# Patient Record
Sex: MALE | Race: WHITE | NOT HISPANIC OR LATINO | Employment: FULL TIME | URBAN - METROPOLITAN AREA
[De-identification: names, ages, dates, MRNs, and addresses within clinical notes are randomized per-mention and may not be internally consistent; named-entity substitution may affect disease eponyms.]

---

## 2023-06-02 ENCOUNTER — OFFICE VISIT (OUTPATIENT)
Dept: PRIMARY CARE | Facility: CLINIC | Age: 43
End: 2023-06-02
Payer: COMMERCIAL

## 2023-06-02 VITALS
BODY MASS INDEX: 24.91 KG/M2 | SYSTOLIC BLOOD PRESSURE: 118 MMHG | DIASTOLIC BLOOD PRESSURE: 76 MMHG | WEIGHT: 168.2 LBS | HEIGHT: 69 IN | TEMPERATURE: 97.8 F

## 2023-06-02 DIAGNOSIS — Z00.00 HEALTHCARE MAINTENANCE: Primary | ICD-10-CM

## 2023-06-02 PROCEDURE — 99213 OFFICE O/P EST LOW 20 MIN: CPT | Performed by: INTERNAL MEDICINE

## 2023-06-02 PROCEDURE — 1036F TOBACCO NON-USER: CPT | Performed by: INTERNAL MEDICINE

## 2023-06-02 NOTE — PROGRESS NOTES
Subjective   Patient ID: [id number]   [facility]    Oswald Montilla is a 43 y.o. male who presents for New Patient Visit (Questioning wether he needs testosterone injections).  He came here to check his testosterone. He is feeling ok though.      Objective   Visit Vitals  /76 (BP Location: Left arm, Patient Position: Sitting)   Temp 36.6 °C (97.8 °F) (Skin)      Review of Systems  All 12 systems reviewed, no other abnormality except that mentioned in HPI.    Physical Exam  Constitutional- no abnormality  ENT- no abnormality  Neck- no swelling  CVS- normal S1 and S2, no murmur.  Pulmonary- clear to auscultation,no rhonchi, no wheezes.  Abdomen- normal- liver and spleen, soft, no distension, bowel sound present.  Neurological- all cranial nerves intact, speech and gait normal, no sensory or motor deficiency.  Musculoskeletal- all pulses are normal, normal movement, no joint swelling.  Skin- no rash, dry.  psychiatry- no suicidal ideation.  Genitourinary system- no abnormality.  Lymph node- no lyphadenopathy      Assessment/Plan   Problem List Items Addressed This Visit    None  Visit Diagnoses       Healthcare maintenance    -  Primary    Relevant Orders    Testosterone, total and free        His blood work ordered and need a physical visit.    Deep Sutherland MD

## 2023-06-07 ENCOUNTER — LAB (OUTPATIENT)
Dept: LAB | Facility: LAB | Age: 43
End: 2023-06-07
Payer: COMMERCIAL

## 2023-06-07 DIAGNOSIS — Z00.00 HEALTHCARE MAINTENANCE: ICD-10-CM

## 2023-06-07 LAB
ALANINE AMINOTRANSFERASE (SGPT) (U/L) IN SER/PLAS: 20 U/L (ref 10–52)
ALBUMIN (G/DL) IN SER/PLAS: 4.1 G/DL (ref 3.4–5)
ALKALINE PHOSPHATASE (U/L) IN SER/PLAS: 86 U/L (ref 33–120)
ANION GAP IN SER/PLAS: 16 MMOL/L (ref 10–20)
ASPARTATE AMINOTRANSFERASE (SGOT) (U/L) IN SER/PLAS: 24 U/L (ref 9–39)
BILIRUBIN TOTAL (MG/DL) IN SER/PLAS: 1.1 MG/DL (ref 0–1.2)
CALCIDIOL (25 OH VITAMIN D3) (NG/ML) IN SER/PLAS: 38 NG/ML
CALCIUM (MG/DL) IN SER/PLAS: 9.5 MG/DL (ref 8.6–10.6)
CARBON DIOXIDE, TOTAL (MMOL/L) IN SER/PLAS: 26 MMOL/L (ref 21–32)
CHLORIDE (MMOL/L) IN SER/PLAS: 102 MMOL/L (ref 98–107)
CHOLESTEROL (MG/DL) IN SER/PLAS: 160 MG/DL (ref 0–199)
CHOLESTEROL IN HDL (MG/DL) IN SER/PLAS: 52.8 MG/DL
CHOLESTEROL/HDL RATIO: 3
CREATININE (MG/DL) IN SER/PLAS: 0.73 MG/DL (ref 0.5–1.3)
ERYTHROCYTE DISTRIBUTION WIDTH (RATIO) BY AUTOMATED COUNT: 14.5 % (ref 11.5–14.5)
ERYTHROCYTE MEAN CORPUSCULAR HEMOGLOBIN CONCENTRATION (G/DL) BY AUTOMATED: 31.8 G/DL (ref 32–36)
ERYTHROCYTE MEAN CORPUSCULAR VOLUME (FL) BY AUTOMATED COUNT: 83 FL (ref 80–100)
ERYTHROCYTES (10*6/UL) IN BLOOD BY AUTOMATED COUNT: 5.41 X10E12/L (ref 4.5–5.9)
ESTIMATED AVERAGE GLUCOSE FOR HBA1C: 117 MG/DL
GFR MALE: >90 ML/MIN/1.73M2
GLUCOSE (MG/DL) IN SER/PLAS: 76 MG/DL (ref 74–99)
HEMATOCRIT (%) IN BLOOD BY AUTOMATED COUNT: 44.9 % (ref 41–52)
HEMOGLOBIN (G/DL) IN BLOOD: 14.3 G/DL (ref 13.5–17.5)
HEMOGLOBIN A1C/HEMOGLOBIN TOTAL IN BLOOD: 5.7 %
LDL: 99 MG/DL (ref 0–99)
LEUKOCYTES (10*3/UL) IN BLOOD BY AUTOMATED COUNT: 4.7 X10E9/L (ref 4.4–11.3)
NRBC (PER 100 WBCS) BY AUTOMATED COUNT: 0 /100 WBC (ref 0–0)
PLATELETS (10*3/UL) IN BLOOD AUTOMATED COUNT: 241 X10E9/L (ref 150–450)
POTASSIUM (MMOL/L) IN SER/PLAS: 4.6 MMOL/L (ref 3.5–5.3)
PROTEIN TOTAL: 7 G/DL (ref 6.4–8.2)
SODIUM (MMOL/L) IN SER/PLAS: 139 MMOL/L (ref 136–145)
THYROTROPIN (MIU/L) IN SER/PLAS BY DETECTION LIMIT <= 0.05 MIU/L: 0.79 MIU/L (ref 0.44–3.98)
TRIGLYCERIDE (MG/DL) IN SER/PLAS: 39 MG/DL (ref 0–149)
UREA NITROGEN (MG/DL) IN SER/PLAS: 25 MG/DL (ref 6–23)
VLDL: 8 MG/DL (ref 0–40)

## 2023-06-07 PROCEDURE — 84402 ASSAY OF FREE TESTOSTERONE: CPT

## 2023-06-07 PROCEDURE — 84403 ASSAY OF TOTAL TESTOSTERONE: CPT

## 2023-06-07 PROCEDURE — 36415 COLL VENOUS BLD VENIPUNCTURE: CPT

## 2023-06-07 PROCEDURE — 84443 ASSAY THYROID STIM HORMONE: CPT

## 2023-06-07 PROCEDURE — 83036 HEMOGLOBIN GLYCOSYLATED A1C: CPT

## 2023-06-07 PROCEDURE — 85027 COMPLETE CBC AUTOMATED: CPT

## 2023-06-07 PROCEDURE — 80053 COMPREHEN METABOLIC PANEL: CPT

## 2023-06-07 PROCEDURE — 82306 VITAMIN D 25 HYDROXY: CPT

## 2023-06-07 PROCEDURE — 80061 LIPID PANEL: CPT

## 2023-06-09 ENCOUNTER — TELEPHONE (OUTPATIENT)
Dept: PRIMARY CARE | Facility: CLINIC | Age: 43
End: 2023-06-09
Payer: COMMERCIAL

## 2023-06-09 NOTE — TELEPHONE ENCOUNTER
----- Message from Deep Sutherland MD sent at 6/9/2023  3:11 PM EDT -----  Lab results reviewed.    CBC shows normal count  Hemoglobin A1C 5.7   thyroid function test normal  kidney function test normal    liver function test normal  Blood lipid ok, diet control.   Vitamin D 25, can take orally  5000 units daily OTC.

## 2023-06-16 ENCOUNTER — TELEPHONE (OUTPATIENT)
Dept: PRIMARY CARE | Facility: CLINIC | Age: 43
End: 2023-06-16
Payer: COMMERCIAL

## 2023-06-16 LAB
TESTOSTERONE FREE (CHAN): 53.9 PG/ML (ref 35–155)
TESTOSTERONE,TOTAL,LC-MS/MS: 396 NG/DL (ref 250–1100)

## 2023-06-16 NOTE — TELEPHONE ENCOUNTER
----- Message from Deep Sutherland MD sent at 6/16/2023  9:01 AM EDT -----  Testosterone level normal.  ----- Message -----  From: Lab, Background User  Sent: 6/7/2023   1:29 PM EDT  To: Deep Sutherland MD

## 2023-07-05 ENCOUNTER — OFFICE VISIT (OUTPATIENT)
Dept: PRIMARY CARE | Facility: CLINIC | Age: 43
End: 2023-07-05
Payer: COMMERCIAL

## 2023-07-05 VITALS — WEIGHT: 162.2 LBS | DIASTOLIC BLOOD PRESSURE: 60 MMHG | BODY MASS INDEX: 23.95 KG/M2 | SYSTOLIC BLOOD PRESSURE: 118 MMHG

## 2023-07-05 DIAGNOSIS — Z00.00 ANNUAL PHYSICAL EXAM: Primary | ICD-10-CM

## 2023-07-05 DIAGNOSIS — E55.9 VITAMIN D DEFICIENCY: ICD-10-CM

## 2023-07-05 PROCEDURE — 1036F TOBACCO NON-USER: CPT | Performed by: INTERNAL MEDICINE

## 2023-07-05 PROCEDURE — 99396 PREV VISIT EST AGE 40-64: CPT | Performed by: INTERNAL MEDICINE

## 2023-07-05 RX ORDER — VALACYCLOVIR HYDROCHLORIDE 1 G/1
TABLET, FILM COATED ORAL AS NEEDED
COMMUNITY
Start: 2018-09-19

## 2023-07-05 RX ORDER — CICLOPIROX 1 G/100ML
SHAMPOO TOPICAL
COMMUNITY
Start: 2023-06-21

## 2023-07-05 RX ORDER — CLINDAMYCIN PHOSPHATE 10 MG/G
GEL TOPICAL
COMMUNITY
Start: 2023-06-21

## 2023-07-05 ASSESSMENT — PATIENT HEALTH QUESTIONNAIRE - PHQ9: 2. FEELING DOWN, DEPRESSED OR HOPELESS: NOT AT ALL

## 2023-07-05 NOTE — PROGRESS NOTES
Subjective   Patient ID: [id number]   [facility]    Oswald Montilla is a 43 y.o. male who presents for Annual Exam.Discussed lab results with him specially testosterone. He will take Vitamin D OTC.        Objective   Visit Vitals  /60 (BP Location: Left arm, Patient Position: Sitting)      Review of Systems  All 12 systems reviewed, no other abnormality except that mentioned in HPI.    Physical Exam  Constitutional- no abnormality  ENT- no abnormality  Neck- no swelling  CVS- normal S1 and S2, no murmur.  Pulmonary- clear to auscultation,no rhonchi, no wheezes.  Abdomen- normal- liver and spleen, soft, no distension, bowel sound present.  Neurological- all cranial nerves intact, speech and gait normal, no sensory or motor deficiency.  Musculoskeletal- all pulses are normal, normal movement, no joint swelling.  Skin- no rash, dry.  psychiatry- no suicidal ideation.  Genitourinary system- no abnormality.  Lymph node- no lyphadenopathy      Assessment/Plan   Problem List Items Addressed This Visit       Vitamin D deficiency     Advised to take vitamin D OTC 5000 units PO daily.          Other Visit Diagnoses       Annual physical exam    -  Primary            Deep Sutherland MD

## 2024-06-12 ENCOUNTER — APPOINTMENT (OUTPATIENT)
Dept: PRIMARY CARE | Facility: CLINIC | Age: 44
End: 2024-06-12
Payer: COMMERCIAL

## 2024-06-12 VITALS
TEMPERATURE: 97.7 F | HEIGHT: 71 IN | DIASTOLIC BLOOD PRESSURE: 78 MMHG | BODY MASS INDEX: 24.36 KG/M2 | WEIGHT: 174 LBS | SYSTOLIC BLOOD PRESSURE: 120 MMHG

## 2024-06-12 DIAGNOSIS — I51.81 STRESS-INDUCED CARDIOMYOPATHY: ICD-10-CM

## 2024-06-12 DIAGNOSIS — R73.03 PREDIABETES: Primary | ICD-10-CM

## 2024-06-12 DIAGNOSIS — N52.9 ERECTILE DYSFUNCTION, UNSPECIFIED ERECTILE DYSFUNCTION TYPE: ICD-10-CM

## 2024-06-12 DIAGNOSIS — B00.89 HERPES GLADIATORUM: ICD-10-CM

## 2024-06-12 DIAGNOSIS — Z86.79 HISTORY OF ATRIAL FIBRILLATION: ICD-10-CM

## 2024-06-12 DIAGNOSIS — I48.91 ATRIAL FIBRILLATION, UNSPECIFIED TYPE (MULTI): ICD-10-CM

## 2024-06-12 PROBLEM — Z97.3 WEARS EYEGLASSES: Status: ACTIVE | Noted: 2024-06-12

## 2024-06-12 PROBLEM — I42.9 CARDIOMYOPATHY (MULTI): Status: ACTIVE | Noted: 2024-06-12

## 2024-06-12 PROBLEM — R79.89 LOW TESTOSTERONE: Status: ACTIVE | Noted: 2024-06-12

## 2024-06-12 PROBLEM — F32.A DEPRESSION DUE TO HEAD INJURY: Status: ACTIVE | Noted: 2024-06-12

## 2024-06-12 PROBLEM — E55.9 VITAMIN D INSUFFICIENCY: Status: ACTIVE | Noted: 2024-06-12

## 2024-06-12 PROBLEM — S09.90XA DEPRESSION DUE TO HEAD INJURY: Status: ACTIVE | Noted: 2024-06-12

## 2024-06-12 PROBLEM — H53.2 DOUBLE VISION: Status: ACTIVE | Noted: 2024-06-12

## 2024-06-12 PROBLEM — B00.9 HERPES SIMPLEX: Status: ACTIVE | Noted: 2024-06-12

## 2024-06-12 PROBLEM — F41.1 GAD (GENERALIZED ANXIETY DISORDER): Status: ACTIVE | Noted: 2024-06-12

## 2024-06-12 PROCEDURE — 99214 OFFICE O/P EST MOD 30 MIN: CPT | Performed by: FAMILY MEDICINE

## 2024-06-12 RX ORDER — SILDENAFIL 50 MG/1
50 TABLET, FILM COATED ORAL DAILY PRN
Qty: 10 TABLET | Refills: 0 | Status: SHIPPED | OUTPATIENT
Start: 2024-06-12 | End: 2024-07-12

## 2024-06-12 ASSESSMENT — PATIENT HEALTH QUESTIONNAIRE - PHQ9
2. FEELING DOWN, DEPRESSED OR HOPELESS: NOT AT ALL
1. LITTLE INTEREST OR PLEASURE IN DOING THINGS: NOT AT ALL
SUM OF ALL RESPONSES TO PHQ9 QUESTIONS 1 AND 2: 0

## 2024-06-12 NOTE — PROGRESS NOTES
"Subjective   Patient ID: Oswald Montilla is a 44 y.o. male who presents for Follow-up (GABRIELA from Dr. Sutherland, discuss getting on Viagra).    Patient presenting for transfer of care and wants to discuss Viagra for erectile dysfunction.  Last lab work was completed about a year ago testosterone was within normal limits A1c was elevated at 5.7 prediabetes CBC within normal limits CMP within normal limits lipid panel within normal limits TSH within normal limits    Works as a teacher in [employer]     Hx of appendectomy     Cardiomyoatphy   - echocaridogram in 2020 EF 35-40%  - left atrium moderately dilated   - global hypokinesis   - no swelling   - no SOB     A fib s/p ablation   - hasn't experienced in some years  - occasional palpitations.     Objective   /78   Temp 36.5 °C (97.7 °F)   Ht 1.791 m (5' 10.5\")   Wt 78.9 kg (174 lb)   BMI 24.61 kg/m²     Physical Exam  Constitutional:       General: He is not in acute distress.     Appearance: Normal appearance. He is not ill-appearing, toxic-appearing or diaphoretic.   HENT:      Head: Normocephalic and atraumatic.   Eyes:      Extraocular Movements: Extraocular movements intact.      Pupils: Pupils are equal, round, and reactive to light.   Cardiovascular:      Rate and Rhythm: Normal rate and regular rhythm.      Pulses: Normal pulses.      Heart sounds: Normal heart sounds.   Pulmonary:      Effort: Pulmonary effort is normal.      Breath sounds: Normal breath sounds.   Neurological:      Mental Status: He is alert.         Assessment/Plan   Diagnoses and all orders for this visit:  Prediabetes  -     Hemoglobin A1C; Future  Erectile dysfunction, unspecified erectile dysfunction type  -     Comprehensive Metabolic Panel; Future  -     TSH with reflex to Free T4 if abnormal; Future  -     Testosterone,Free and Total; Future  -     Lipid Panel; Future  -     Prolactin; Future  -     sildenafil (Viagra) 50 mg tablet; Take 1 tablet (50 mg) by mouth once " daily as needed for erectile dysfunction.  Stress-induced cardiomyopathy  -     Echocardiogram - Onbase Scan; Future  History of atrial fibrillation  - s/p ablation; hasn't seen cardio in years.   Herpes gladiatorum      Discussed chronic conditions as above. Will obtain echo as he has hx of EF 40-45%; and no follow-up for several years. Would ideally have him follow w/ cardiology.       Clarice Saravia DO     I spent a total of 26 minutes on the date of the service which included preparing to see the patient, face-to-face patient care, completing clinical documentation, performing a medically appropriate examination, counseling and educating the patient/family/caregiver and ordering medications, tests, or procedures.

## 2024-07-08 ENCOUNTER — APPOINTMENT (OUTPATIENT)
Dept: PRIMARY CARE | Facility: CLINIC | Age: 44
End: 2024-07-08
Payer: COMMERCIAL

## 2024-09-24 DIAGNOSIS — B35.9 TINEA: Primary | ICD-10-CM

## 2024-09-24 RX ORDER — CICLOPIROX 1 G/100ML
1 SHAMPOO TOPICAL EVERY OTHER DAY
Qty: 120 ML | Refills: 3 | Status: SHIPPED | OUTPATIENT
Start: 2024-09-24 | End: 2025-09-24

## 2025-02-05 DIAGNOSIS — N52.9 ERECTILE DYSFUNCTION, UNSPECIFIED ERECTILE DYSFUNCTION TYPE: ICD-10-CM

## 2025-02-05 RX ORDER — SILDENAFIL 50 MG/1
50 TABLET, FILM COATED ORAL DAILY PRN
Qty: 10 TABLET | Refills: 1 | Status: SHIPPED | OUTPATIENT
Start: 2025-02-05 | End: 2025-03-07

## 2025-03-04 ENCOUNTER — OFFICE VISIT (OUTPATIENT)
Dept: PRIMARY CARE | Facility: CLINIC | Age: 45
End: 2025-03-04
Payer: COMMERCIAL

## 2025-03-04 VITALS
SYSTOLIC BLOOD PRESSURE: 100 MMHG | DIASTOLIC BLOOD PRESSURE: 61 MMHG | BODY MASS INDEX: 25.94 KG/M2 | WEIGHT: 183.4 LBS | HEART RATE: 56 BPM | TEMPERATURE: 97.5 F

## 2025-03-04 DIAGNOSIS — R79.89 LOW TESTOSTERONE: Primary | ICD-10-CM

## 2025-03-04 DIAGNOSIS — E55.9 VITAMIN D DEFICIENCY: ICD-10-CM

## 2025-03-04 DIAGNOSIS — R53.83 OTHER FATIGUE: ICD-10-CM

## 2025-03-04 PROCEDURE — 99213 OFFICE O/P EST LOW 20 MIN: CPT | Performed by: STUDENT IN AN ORGANIZED HEALTH CARE EDUCATION/TRAINING PROGRAM

## 2025-03-04 PROCEDURE — 1036F TOBACCO NON-USER: CPT | Performed by: STUDENT IN AN ORGANIZED HEALTH CARE EDUCATION/TRAINING PROGRAM

## 2025-03-04 ASSESSMENT — ENCOUNTER SYMPTOMS
DIARRHEA: 0
LIGHT-HEADEDNESS: 0
VOMITING: 0
UNEXPECTED WEIGHT CHANGE: 0
NAUSEA: 0
DIZZINESS: 0
CHILLS: 0
ARTHRALGIAS: 0
MYALGIAS: 0
CONSTIPATION: 0
ABDOMINAL PAIN: 0
FATIGUE: 1
HEADACHES: 0
SHORTNESS OF BREATH: 0
COUGH: 0
FEVER: 0
BLOOD IN STOOL: 0
RHINORRHEA: 0

## 2025-03-04 ASSESSMENT — PATIENT HEALTH QUESTIONNAIRE - PHQ9
1. LITTLE INTEREST OR PLEASURE IN DOING THINGS: NOT AT ALL
2. FEELING DOWN, DEPRESSED OR HOPELESS: NOT AT ALL
SUM OF ALL RESPONSES TO PHQ9 QUESTIONS 1 AND 2: 0

## 2025-03-04 NOTE — PROGRESS NOTES
Assessment/Plan   Assessment & Plan  Low testosterone    Orders:    Testosterone, total and free; Future    Vitamin D deficiency    Orders:    Vitamin D 25-Hydroxy,Total (for eval of Vitamin D levels); Future    Other fatigue    Orders:    CBC; Future    Comprehensive Metabolic Panel; Future    Hemoglobin A1C; Future    Testosterone, total and free; Future    Vitamin B12; Future    Vitamin D 25-Hydroxy,Total (for eval of Vitamin D levels); Future    Tsh With Reflex To Free T4 If Abnormal; Future        Subjective   Patient ID: Oswald Montilla is a 44 y.o. male who presents for Fatigue (Noticed his activity level has changed. He is not able to do as many pull ups in his work out then he did. He said it's the same thing for running outside. He is struggling to finish any work out. ) and Lab Orders (Would like blood work done.).  Fatigue  Associated symptoms include fatigue. Pertinent negatives include no abdominal pain, arthralgias, chest pain, chills, congestion, coughing, fever, headaches, myalgias, nausea, rash or vomiting.     Patient is here to discuss fatigue.. He feels weaker than usual, has beenstruggling to do strength training eercises and has lesss stamina than in the past. Denies other associated symptoms.       Review of Systems   Constitutional:  Positive for fatigue. Negative for chills, fever and unexpected weight change.   HENT:  Negative for congestion and rhinorrhea.    Eyes:  Negative for visual disturbance.   Respiratory:  Negative for cough and shortness of breath.    Cardiovascular:  Negative for chest pain.   Gastrointestinal:  Negative for abdominal pain, blood in stool, constipation, diarrhea, nausea and vomiting.   Musculoskeletal:  Negative for arthralgias and myalgias.   Skin:  Negative for rash.   Neurological:  Negative for dizziness, light-headedness and headaches.       Objective   Physical Exam  Constitutional:       Appearance: Normal appearance.   HENT:      Head: Normocephalic  and atraumatic.   Eyes:      Extraocular Movements: Extraocular movements intact.   Neurological:      General: No focal deficit present.      Mental Status: He is alert and oriented to person, place, and time.      Motor: No weakness.   Psychiatric:         Mood and Affect: Mood normal.         Behavior: Behavior normal.         Thought Content: Thought content normal.         Judgment: Judgment normal.                 Bebeto Infante MD 03/04/25 7:55 AM

## 2025-03-06 LAB
25(OH)D3+25(OH)D2 SERPL-MCNC: 35 NG/ML (ref 30–100)
ALBUMIN SERPL-MCNC: 4.2 G/DL (ref 3.6–5.1)
ALP SERPL-CCNC: 82 U/L (ref 36–130)
ALT SERPL-CCNC: 17 U/L (ref 9–46)
ANION GAP SERPL CALCULATED.4IONS-SCNC: 9 MMOL/L (CALC) (ref 7–17)
AST SERPL-CCNC: 20 U/L (ref 10–40)
BILIRUB SERPL-MCNC: 0.8 MG/DL (ref 0.2–1.2)
BUN SERPL-MCNC: 21 MG/DL (ref 7–25)
CALCIUM SERPL-MCNC: 9 MG/DL (ref 8.6–10.3)
CHLORIDE SERPL-SCNC: 101 MMOL/L (ref 98–110)
CO2 SERPL-SCNC: 27 MMOL/L (ref 20–32)
CREAT SERPL-MCNC: 0.73 MG/DL (ref 0.6–1.29)
EGFRCR SERPLBLD CKD-EPI 2021: 114 ML/MIN/1.73M2
ERYTHROCYTE [DISTWIDTH] IN BLOOD BY AUTOMATED COUNT: 14.9 % (ref 11–15)
EST. AVERAGE GLUCOSE BLD GHB EST-MCNC: 131 MG/DL
EST. AVERAGE GLUCOSE BLD GHB EST-SCNC: 7.3 MMOL/L
GLUCOSE SERPL-MCNC: 99 MG/DL (ref 65–99)
HBA1C MFR BLD: 6.2 % OF TOTAL HGB
HCT VFR BLD AUTO: 46.7 % (ref 38.5–50)
HGB BLD-MCNC: 15.1 G/DL (ref 13.2–17.1)
MCH RBC QN AUTO: 26.7 PG (ref 27–33)
MCHC RBC AUTO-ENTMCNC: 32.3 G/DL (ref 32–36)
MCV RBC AUTO: 82.5 FL (ref 80–100)
PLATELET # BLD AUTO: 259 THOUSAND/UL (ref 140–400)
PMV BLD REES-ECKER: 9.9 FL (ref 7.5–12.5)
POTASSIUM SERPL-SCNC: 4.9 MMOL/L (ref 3.5–5.3)
PROT SERPL-MCNC: 7.2 G/DL (ref 6.1–8.1)
RBC # BLD AUTO: 5.66 MILLION/UL (ref 4.2–5.8)
SODIUM SERPL-SCNC: 137 MMOL/L (ref 135–146)
TESTOST FREE SERPL-MCNC: NORMAL PG/ML
TESTOST SERPL-MCNC: NORMAL NG/DL
TSH SERPL-ACNC: 1.23 MIU/L (ref 0.4–4.5)
VIT B12 SERPL-MCNC: 437 PG/ML (ref 200–1100)
WBC # BLD AUTO: 4.7 THOUSAND/UL (ref 3.8–10.8)

## 2025-03-06 NOTE — RESULT ENCOUNTER NOTE
A1C in prediabetic category, otherwise labs within normal limits. Has follow up with PCP in one month

## 2025-03-07 ENCOUNTER — TELEPHONE (OUTPATIENT)
Dept: PRIMARY CARE | Facility: CLINIC | Age: 45
End: 2025-03-07
Payer: COMMERCIAL

## 2025-03-07 NOTE — TELEPHONE ENCOUNTER
----- Message from Bebeto Infante sent at 3/6/2025 10:36 AM EST -----  A1C in prediabetic category, otherwise labs within normal limits. Has follow up with PCP in one month

## 2025-03-10 LAB
25(OH)D3+25(OH)D2 SERPL-MCNC: 35 NG/ML (ref 30–100)
ALBUMIN SERPL-MCNC: 4.2 G/DL (ref 3.6–5.1)
ALP SERPL-CCNC: 82 U/L (ref 36–130)
ALT SERPL-CCNC: 17 U/L (ref 9–46)
ANION GAP SERPL CALCULATED.4IONS-SCNC: 9 MMOL/L (CALC) (ref 7–17)
AST SERPL-CCNC: 20 U/L (ref 10–40)
BILIRUB SERPL-MCNC: 0.8 MG/DL (ref 0.2–1.2)
BUN SERPL-MCNC: 21 MG/DL (ref 7–25)
CALCIUM SERPL-MCNC: 9 MG/DL (ref 8.6–10.3)
CHLORIDE SERPL-SCNC: 101 MMOL/L (ref 98–110)
CO2 SERPL-SCNC: 27 MMOL/L (ref 20–32)
CREAT SERPL-MCNC: 0.73 MG/DL (ref 0.6–1.29)
EGFRCR SERPLBLD CKD-EPI 2021: 114 ML/MIN/1.73M2
ERYTHROCYTE [DISTWIDTH] IN BLOOD BY AUTOMATED COUNT: 14.9 % (ref 11–15)
EST. AVERAGE GLUCOSE BLD GHB EST-MCNC: 131 MG/DL
EST. AVERAGE GLUCOSE BLD GHB EST-SCNC: 7.3 MMOL/L
GLUCOSE SERPL-MCNC: 99 MG/DL (ref 65–99)
HBA1C MFR BLD: 6.2 % OF TOTAL HGB
HCT VFR BLD AUTO: 46.7 % (ref 38.5–50)
HGB BLD-MCNC: 15.1 G/DL (ref 13.2–17.1)
MCH RBC QN AUTO: 26.7 PG (ref 27–33)
MCHC RBC AUTO-ENTMCNC: 32.3 G/DL (ref 32–36)
MCV RBC AUTO: 82.5 FL (ref 80–100)
PLATELET # BLD AUTO: 259 THOUSAND/UL (ref 140–400)
PMV BLD REES-ECKER: 9.9 FL (ref 7.5–12.5)
POTASSIUM SERPL-SCNC: 4.9 MMOL/L (ref 3.5–5.3)
PROT SERPL-MCNC: 7.2 G/DL (ref 6.1–8.1)
RBC # BLD AUTO: 5.66 MILLION/UL (ref 4.2–5.8)
SODIUM SERPL-SCNC: 137 MMOL/L (ref 135–146)
TESTOST FREE SERPL-MCNC: 76.8 PG/ML (ref 35–155)
TESTOST SERPL-MCNC: 471 NG/DL (ref 250–1100)
TSH SERPL-ACNC: 1.23 MIU/L (ref 0.4–4.5)
VIT B12 SERPL-MCNC: 437 PG/ML (ref 200–1100)
WBC # BLD AUTO: 4.7 THOUSAND/UL (ref 3.8–10.8)

## 2025-03-13 ENCOUNTER — TELEPHONE (OUTPATIENT)
Dept: PRIMARY CARE | Facility: CLINIC | Age: 45
End: 2025-03-13
Payer: COMMERCIAL

## 2025-03-13 DIAGNOSIS — Z87.828 HISTORY OF TRAUMATIC HEAD INJURY: Primary | ICD-10-CM

## 2025-03-13 NOTE — TELEPHONE ENCOUNTER
Left a voicemail stating a referral was placed for him but to please call back to let us know why he wants the referral.

## 2025-03-13 NOTE — TELEPHONE ENCOUNTER
Referral placed amado I don't have a clear cut diagnosis for the referral; can you call and see why he is interested in this referral.

## 2025-03-14 NOTE — TELEPHONE ENCOUNTER
Thank you! I updated the order diagnosis.     You can schedule via [employer] or if you prefer, you can call our  and she can help schedule the appointment for you.   Vicenta Hodgson  Patient   Referral Management  [phone]

## 2025-03-25 ENCOUNTER — APPOINTMENT (OUTPATIENT)
Dept: PRIMARY CARE | Facility: CLINIC | Age: 45
End: 2025-03-25
Payer: COMMERCIAL

## 2025-03-25 VITALS
DIASTOLIC BLOOD PRESSURE: 60 MMHG | OXYGEN SATURATION: 98 % | SYSTOLIC BLOOD PRESSURE: 100 MMHG | WEIGHT: 184 LBS | BODY MASS INDEX: 26.03 KG/M2 | HEART RATE: 77 BPM | TEMPERATURE: 96.4 F

## 2025-03-25 DIAGNOSIS — I42.8 OTHER CARDIOMYOPATHY: ICD-10-CM

## 2025-03-25 DIAGNOSIS — Z87.828 HISTORY OF TRAUMATIC HEAD INJURY: Primary | ICD-10-CM

## 2025-03-25 DIAGNOSIS — I48.91 ATRIAL FIBRILLATION, UNSPECIFIED TYPE (MULTI): ICD-10-CM

## 2025-03-25 PROCEDURE — 99214 OFFICE O/P EST MOD 30 MIN: CPT | Performed by: INTERNAL MEDICINE

## 2025-03-25 PROCEDURE — 1036F TOBACCO NON-USER: CPT | Performed by: INTERNAL MEDICINE

## 2025-03-25 ASSESSMENT — ENCOUNTER SYMPTOMS
COLOR CHANGE: 0
APPETITE CHANGE: 0
SINUS PAIN: 0
SORE THROAT: 0
FACIAL ASYMMETRY: 0
BLOOD IN STOOL: 0
HEADACHES: 0
BACK PAIN: 0
SLEEP DISTURBANCE: 0
WOUND: 0
WHEEZING: 0
DYSPHORIC MOOD: 0
NAUSEA: 0
POLYDIPSIA: 0
PHOTOPHOBIA: 0
FEVER: 0
DIZZINESS: 0
SPEECH DIFFICULTY: 0
SHORTNESS OF BREATH: 0
WEAKNESS: 0
STRIDOR: 0
EYE DISCHARGE: 0
POLYPHAGIA: 0
TREMORS: 0
ABDOMINAL PAIN: 0
CONSTIPATION: 0
DIFFICULTY URINATING: 0
HEMATURIA: 0
CHEST TIGHTNESS: 0
BRUISES/BLEEDS EASILY: 0
FREQUENCY: 0
SINUS PRESSURE: 0
FATIGUE: 0
RHINORRHEA: 0
CONFUSION: 0
COUGH: 0
NERVOUS/ANXIOUS: 0
CHOKING: 0
ARTHRALGIAS: 0
SEIZURES: 0
FLANK PAIN: 0
EYE REDNESS: 0
VOICE CHANGE: 0
PALPITATIONS: 0
ABDOMINAL DISTENTION: 0
DIARRHEA: 0
TROUBLE SWALLOWING: 0
NECK STIFFNESS: 0
ACTIVITY CHANGE: 0
MYALGIAS: 0
VOMITING: 0
DYSURIA: 0
DECREASED CONCENTRATION: 0
NUMBNESS: 0
HALLUCINATIONS: 0

## 2025-03-25 ASSESSMENT — PATIENT HEALTH QUESTIONNAIRE - PHQ9
2. FEELING DOWN, DEPRESSED OR HOPELESS: NOT AT ALL
SUM OF ALL RESPONSES TO PHQ9 QUESTIONS 1 AND 2: 0
1. LITTLE INTEREST OR PLEASURE IN DOING THINGS: NOT AT ALL

## 2025-03-25 ASSESSMENT — PAIN SCALES - GENERAL: PAINLEVEL_OUTOF10: 0-NO PAIN
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
## 2025-03-25 NOTE — ASSESSMENT & PLAN NOTE
His heart rate is irregular. Patient is not on any medicine and it has been discussed with the patient and he should consider seeing cardiology and PCP for this.

## 2025-03-25 NOTE — ASSESSMENT & PLAN NOTE
Asymptomatic.  Patient do not need any work up. Letter has been provided. If he develop any symptoms in the future than evaluation may be warranted but at this time nothing else has to be done.

## 2025-03-25 NOTE — ASSESSMENT & PLAN NOTE
Patient said that he got ablation of the heart for Afib and never had any problem since then from last 2 years but he is not following any cardiology.

## 2025-03-28 ENCOUNTER — OFFICE VISIT (OUTPATIENT)
Dept: PRIMARY CARE | Facility: CLINIC | Age: 45
End: 2025-03-28
Payer: COMMERCIAL

## 2025-03-28 VITALS
WEIGHT: 181.2 LBS | HEART RATE: 102 BPM | TEMPERATURE: 97.3 F | OXYGEN SATURATION: 96 % | SYSTOLIC BLOOD PRESSURE: 101 MMHG | BODY MASS INDEX: 25.63 KG/M2 | DIASTOLIC BLOOD PRESSURE: 65 MMHG

## 2025-03-28 DIAGNOSIS — I49.9 IRREGULAR CARDIAC RHYTHM: Primary | ICD-10-CM

## 2025-03-28 DIAGNOSIS — Z86.79 HISTORY OF ATRIAL FIBRILLATION: ICD-10-CM

## 2025-03-28 PROCEDURE — 99214 OFFICE O/P EST MOD 30 MIN: CPT | Performed by: STUDENT IN AN ORGANIZED HEALTH CARE EDUCATION/TRAINING PROGRAM

## 2025-03-28 PROCEDURE — 1036F TOBACCO NON-USER: CPT | Performed by: STUDENT IN AN ORGANIZED HEALTH CARE EDUCATION/TRAINING PROGRAM

## 2025-03-28 PROCEDURE — 93000 ELECTROCARDIOGRAM COMPLETE: CPT | Performed by: STUDENT IN AN ORGANIZED HEALTH CARE EDUCATION/TRAINING PROGRAM

## 2025-03-28 RX ORDER — METOPROLOL SUCCINATE 25 MG/1
25 TABLET, EXTENDED RELEASE ORAL DAILY
Qty: 30 TABLET | Refills: 11 | Status: SHIPPED | OUTPATIENT
Start: 2025-03-28 | End: 2026-03-28

## 2025-03-28 ASSESSMENT — ENCOUNTER SYMPTOMS
FATIGUE: 1
RHINORRHEA: 0
SHORTNESS OF BREATH: 0
CHILLS: 0
FEVER: 0
PALPITATIONS: 1

## 2025-03-28 NOTE — ASSESSMENT & PLAN NOTE
Orders:    Referral to Cardiology; Future    metoprolol succinate XL (Toprol-XL) 25 mg 24 hr tablet; Take 1 tablet (25 mg) by mouth once daily. Do not crush or chew.
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
## 2025-03-31 ENCOUNTER — ANCILLARY PROCEDURE (OUTPATIENT)
Dept: CARDIOLOGY | Facility: CLINIC | Age: 45
End: 2025-03-31
Payer: COMMERCIAL

## 2025-03-31 ENCOUNTER — APPOINTMENT (OUTPATIENT)
Dept: CARDIOLOGY | Facility: CLINIC | Age: 45
End: 2025-03-31
Payer: COMMERCIAL

## 2025-03-31 VITALS
WEIGHT: 177 LBS | HEIGHT: 71 IN | BODY MASS INDEX: 24.78 KG/M2 | OXYGEN SATURATION: 96 % | DIASTOLIC BLOOD PRESSURE: 60 MMHG | HEART RATE: 86 BPM | SYSTOLIC BLOOD PRESSURE: 106 MMHG

## 2025-03-31 DIAGNOSIS — I48.91 ATRIAL FIBRILLATION, UNSPECIFIED TYPE (MULTI): ICD-10-CM

## 2025-03-31 DIAGNOSIS — I48.91 ATRIAL FIBRILLATION, UNSPECIFIED TYPE (MULTI): Primary | ICD-10-CM

## 2025-03-31 DIAGNOSIS — I51.81 STRESS-INDUCED CARDIOMYOPATHY: ICD-10-CM

## 2025-03-31 DIAGNOSIS — R06.09 DOE (DYSPNEA ON EXERTION): ICD-10-CM

## 2025-03-31 LAB — BODY SURFACE AREA: 2 M2

## 2025-03-31 PROCEDURE — 3008F BODY MASS INDEX DOCD: CPT

## 2025-03-31 PROCEDURE — 99214 OFFICE O/P EST MOD 30 MIN: CPT

## 2025-03-31 PROCEDURE — 93247 EXT ECG>7D<15D SCAN A/R: CPT

## 2025-03-31 PROCEDURE — 1036F TOBACCO NON-USER: CPT

## 2025-03-31 PROCEDURE — 93246 EXT ECG>7D<15D RECORDING: CPT

## 2025-03-31 PROCEDURE — 99244 OFF/OP CNSLTJ NEW/EST MOD 40: CPT

## 2025-03-31 RX ORDER — METOPROLOL SUCCINATE 50 MG/1
50 TABLET, EXTENDED RELEASE ORAL DAILY
Qty: 30 TABLET | Refills: 11 | Status: SHIPPED | OUTPATIENT
Start: 2025-03-31 | End: 2025-04-03 | Stop reason: SINTOL

## 2025-03-31 ASSESSMENT — ENCOUNTER SYMPTOMS
DYSPNEA ON EXERTION: 1
PALPITATIONS: 1

## 2025-03-31 NOTE — PATIENT INSTRUCTIONS
Mr, Rawling,    Echocardiogram    Holter Monitor for 2 weeks    Start Eliquis 5 mg one tab twice daily     Lab work .    Increase Metoprolol Succinate 50 mg daily     Carmela Baptiste APRN-CNP

## 2025-03-31 NOTE — LETTER
March 31, 2025     Patient: Oswald Montilla   YOB: 1980   Date of Visit: 3/31/2025       To Whom It May Concern:    Oswald Montilla was seen in my clinic on 3/31/2025 at 10:40 am. Please excuse Oswald for his absence from work on this day to make the appointment.    If you have any questions or concerns, please don't hesitate to call.         Sincerely,         Carmela Baptiste, DARWIN-CNP        CC: No Recipients

## 2025-03-31 NOTE — PROGRESS NOTES
Self Referred SOB and Fatigue, Atrial Fibrillation .      History Of Present Illness:    Oswald Montilla is a 45 y.o. male who works a [employer]  presenting with a PMH of Afib with RVR diagnosed in 2019, he underwent uncomplicated PVI. 09/13/2019 underwent ZO guided cardioversion. 3/29/2019 acute decompensated heart failure. ECHO 03/11/2019 showed EF of 20%. MRI 03/12/2019 EF of 13%. AF with RVR was thought to be the reason for his cardiomyopathy.     He reports that for the past couple of months he has been feeling more SOB and fatigue. He typcially exercises but has not been able to do so d/t fatigue. Patient denies chest pain and angina.  Pt denies orthopnea, and paroxysmal nocturnal dyspnea.  Pt denies worsening lower extremity edema.  Pt denies  syncope.  No recent falls.  No fever or chills.  No cough.  No change in bowel or bladder habits.  No sick contacts.  No recent travel.    Review of Systems   Cardiovascular:  Positive for dyspnea on exertion and palpitations.   All other systems reviewed and are negative.    Past Medical History:  He has a past medical history of Other conditions influencing health status and Personal history of other diseases of the nervous system and sense organs.    Past Surgical History:  He has a past surgical history that includes Other surgical history (10/09/2017) and Appendectomy (10/09/2017).      Social History:  He reports that he has never smoked. He has never been exposed to tobacco smoke. He has never used smokeless tobacco. He reports that he does not currently use alcohol. He reports current drug use. Drug: Marijuana.    Family History:  Family History   Problem Relation Name Age of Onset    Heart attack Mother      Colon cancer Father      Diabetes Father      Other (CIRCULATION PROBLEM) Father      Coronary artery disease Father      Heart attack Father's Brother          Allergies:  Patient has no known allergies.    Outpatient  "Medications:  Current Outpatient Medications   Medication Instructions    ciclopirox 1 % shampoo 1 Dose, Topical, Every other day    metoprolol succinate XL (TOPROL-XL) 25 mg, oral, Daily, Do not crush or chew.    sildenafil (VIAGRA) 50 mg, oral, Daily PRN    valACYclovir (Valtrex) 1 gram tablet As needed        Last Recorded Vitals:  Vitals:    03/31/25 1043   BP: 106/60   Pulse: 86   SpO2: 96%   Weight: 80.3 kg (177 lb)   Height: 1.791 m (5' 10.5\")     Physical Exam  Constitutional:       Appearance: Normal appearance.   Neck:      Vascular: No carotid bruit.   Cardiovascular:      Rate and Rhythm: Normal rate. Rhythm irregular.      Pulses: Normal pulses.      Heart sounds: Normal heart sounds.      No gallop.   Pulmonary:      Effort: Pulmonary effort is normal.      Breath sounds: Normal breath sounds.   Abdominal:      Palpations: Abdomen is soft.   Musculoskeletal:      Cervical back: Neck supple.   Skin:     General: Skin is warm.      Capillary Refill: Capillary refill takes less than 2 seconds.   Neurological:      General: No focal deficit present.      Mental Status: He is alert and oriented to person, place, and time.   Psychiatric:         Mood and Affect: Mood normal.       Last Labs:  CBC -  Lab Results   Component Value Date    WBC 4.7 03/05/2025    HGB 15.1 03/05/2025    HCT 46.7 03/05/2025    MCV 82.5 03/05/2025     03/05/2025       CMP -  Lab Results   Component Value Date    CALCIUM 9.0 03/05/2025    PROT 7.2 03/05/2025    ALBUMIN 4.2 03/05/2025    AST 20 03/05/2025    ALT 17 03/05/2025    ALKPHOS 82 03/05/2025    BILITOT 0.8 03/05/2025       LIPID PANEL -   Lab Results   Component Value Date    CHOL 160 06/07/2023    TRIG 39 06/07/2023    HDL 52.8 06/07/2023    CHHDL 3.0 06/07/2023    LDLF 99 06/07/2023    VLDL 8 06/07/2023       RENAL FUNCTION PANEL -   Lab Results   Component Value Date    GLUCOSE 99 03/05/2025     03/05/2025    K 4.9 03/05/2025     03/05/2025    CO2 27 " 03/05/2025    ANIONGAP 9 03/05/2025    BUN 21 03/05/2025    CREATININE 0.73 03/05/2025    GFRMALE >90 06/07/2023    CALCIUM 9.0 03/05/2025    ALBUMIN 4.2 03/05/2025        Lab Results   Component Value Date    HGBA1C 6.2 (H) 03/05/2025       Last Cardiology Tests:  ECG:  ECG 12 lead (Clinic Performed) 03/28/2025      Echo 2020  CONCLUSIONS:   1. The left ventricular systolic function is moderately decreased with a 35-40% estimated ejection fraction.   2. The left atrium is moderately dilated.   3. There is global hypokinesis of the left ventricle with minor regional variations.    Echo 2019  CONCLUSIONS:   1. The left ventricular systolic function is severely decreased with a 30-35% estimated ejection fraction.   2. The left atrium is severely dilated.   3. The left ventricular cavity size is moderately dilated.   4. There is global hypokinesis of the left ventricle with minor regional variations.    CT Angio 2019  Findings:  POTENTIAL STUDY LIMITATIONS: Step artifact.     CORONARY ARTERIES:     CORONARY ANATOMY:  There is normal origin of the coronary arteries.     LEFT MAIN CORONARY ARTERY:  The left main is normal sized vessel that bifurcates into the LAD and  circumflex.  There is no significant atherosclerotic change or stenotic disease.     LEFT ANTERIOR DESCENDING ARTERY:  The LAD is a normal size vessel that tapers as it reaches the apex.  It gives rise to 1 acute diagonal branches.  There is mild focal atherosclerotic calcification in the proximal LAD  without significant luminal stenosis (less than 25%).     LEFT CIRCUMFLEX ARTERY:  The LCX is a normal size vessel, which is non-dominant.  It gives rise to 2 obtuse marginal branches.  There is no significant atherosclerotic change or stenotic disease.     RAMUS INTERMEDIUS:  The ramus is a normal sized vessel.  It gives rise to 1 obtuse marginal/diagonal branches.  There is no significant atherosclerotic change or stenotic disease.     RIGHT CORONARY  ARTERY:  The RCA is a normal size vessel, which is dominant .  It gives rise to a conus branch, scooby branch, and 3 acute marginal  branches.  In its distal segment it bifurcates into the PDA and PV  branch.  There is no significant atherosclerotic change or stenotic disease.     CARDIAC CHAMBERS:  The cardiac chambers demonstrate normal atrioventricular and  ventriculoarterial concordance, and systemic and pulmonary venous  return.     LEFT ATRIUM:  Normal size (23-cm2)     RIGHT ATRIUM:  Normal size (17-cm2)     INTERATRIAL SEPTUM:  Intact.     LEFT VENTRICLE:  Dilated (6.0-cm)     RIGHT VENTRICLE:  Normal size (4.6-cm)     AORTIC VALVE:  The aortic valve is trileaflet in morphology. No calcifications.     MITRAL VALVE:  No thickening/calcification.     THORACIC AORTA:  The visualized thoracic aorta is normal in course, caliber, and  contour.  There is no acute aortic pathology, such as dissection, intramural  hematoma, or contained rupture.  The aortic arch is not included on this examination.     PERICARDIUM:  There is no pericardial effusion of thickening.     CHEST:  The chest wall is normal.  No significant lymphadenopathy or mass is seen in limited images of  the mediastinum.  Limited imaging through the lungs reveals no gross abnormalities.  No pleural effusion or pneumothorax.     UPPER ABDOMEN:  Limited imaging through the upper abdomen reveals no abnormalities of  the visualized organs.  Small hiatal hernia.     IMPRESSION:  1. Normal coronary anatomy. Right dominant system.  2. Mild focal atherosclerotic calcifications without significant  luminal stenosis.  3. Dilated left ventricle.     Assessment/Plan   Oswald Montilla is a 45 y.o. male who works a [employer]  presenting with a PMH of Afib with RVR diagnosed in 2019, he underwent uncomplicated PVI. 09/13/2019 underwent ZO guided cardioversion. 3/29/2019 acute decompensated heart failure. ECHO 03/11/2019 showed EF of 20%. MRI  03/12/2019 EF of 13%. AF with RVR was thought to be the reason for his cardiomyopathy.     He reports that for the past couple of months he has been feeling more SOB and fatigue. He typcially exercises but has not been able to do so d/t fatigue.     Cardiac MRI 2019  Findings:     1.  Four-chamber cardiomegaly with severe enlargement of the left   ventricle. Severely reduced systolic  function with LVEF of 13%. No regional wall motion abnormality.      2.  No evidence of post contrast delayed enhancement to suggest   infiltrative process or chronic ischemic  damage.     2. Bilateral small plural effusion with mild pulmonary edema.     3. Mild mitral regurgitation.      Assessment/Plan  Oswald Montilla is a 45 y.o. male who works a [employer]  presenting with a PMH of Afib with RVR diagnosed in 2019, he underwent uncomplicated PVI. 09/13/2019 underwent ZO guided cardioversion. 3/29/2019 acute decompensated heart failure. ECHO 03/11/2019 showed EF of 20%. MRI 03/12/2019 EF of 13%. AF with RVR was thought to be the reason for his cardiomyopathy.     He reports that for the past couple of months he has been feeling more SOB and fatigue. He typcially exercises but has not been able to do so d/t fatigue. Today he is normotensive and appears euvolemic. TSH normal 3/2025.     # Atrial Fibrillation [id number] Score 1 point.- HASBLED 0   - Start Eliquis 5 mg one tab twice daily  - Holter Monitor to establish [place]  - Increase Metoprolol Succinate to 50 mg daily   - Echocardiogram  - Follow up in 3 weeks    DARWIN Guardado-CNP

## 2025-03-31 NOTE — LETTER
"Physical Therapy  Progress Note    Time In 8:07  Time Out 9:00      4/7/2017  Minerva Ramachandran MD    Re: Anay LANGLEY  ________________________________________________________________    Ms. Anay LANGLEY, has attended 5 PT sessions.  Treatment has consisted of: there-ex/act, HEP, manual, modalities and pt ed     S: Ms. Anay LANGLEY states: doing well in new brace but still pain with prolonged walking and swells and aches at end of day.  Still popping on outside (ATFL).        Subjective Evaluation    Pain  Current pain rating: 3           Objective     Observations   Left Ankle/Foot   Positive for effusion.     Additional Observation Details  Minimal to no antalgia ambulating without brace    Tenderness   Left Ankle/Foot   Tenderness in the anterior talofibular ligament and deltoid ligament. No tenderness in the peroneal tendon.     Active Range of Motion   Left Ankle/Foot   Dorsiflexion (ke): WFL  Dorsiflexion (kf): WFL  Plantar flexion: WFL  Inversion: WFL  Eversion: WFL    Right Ankle/Foot   Normal active range of motion    Additional Active Range of Motion Details  Pops with DF    Strength/Myotome Testing     Left Ankle/Foot   Dorsiflexion: 5  Plantar flexion: 4+  Inversion: 4+  Eversion: 5    Right Ankle/Foot   Normal strength    Tests   Left Ankle/Foot   Positive for varus tilt.   Negative for anterior drawer, syndesmosis squeeze and valgus tilt.     Additional Tests Details  Crepitus at ATFL with varus tilt    Swelling   Left Ankle/Foot   Malleoli: 26.7 cm    Right Ankle/Foot   Malleoli: 26 cm    Functional Assessment     Comments  Ambulates steps with reciprocal gait with no limp up but slight limp down and c/o \"crunching\" at lateral ankle; tolerates functional squat but also with some crunching lateral ankle     See Exercise, Manual, and Modality Logs for complete treatment.       Assessment & Plan     Assessment  Assessment details: Good overall improvement with increased strength and improved " March 31, 2025     Patient: Oswald Montilla   YOB: 1980   Date of Visit: 3/31/2025       To Whom It May Concern:    Oswald Montilla was seen in my clinic on 3/31/2025 at 10:40 am. Given Mr. Montilla most recent clinical findings he will need to have time off for 2 weeks from work.     If you have any questions or concerns, please don't hesitate to call.         Sincerely,         Carmela Baptiste, DARWIN-CNP        CC: No Recipients   gait with dec c/o pain but still with mod tenderness, instability and crepitus at ATFL.    Plan  Plan details: Please advise after exam                     Manual Therapy:    0     mins  93601;  Therapeutic Exercise:    18     mins  84279;     Neuromuscular Theo:    0    mins  79889;    Therapeutic Activity:     13     mins  20018;     Gait Trainin     mins  30319;     Ultrasound:     0     mins  16946;    Work Hardening           0      mins 75147  Iontophoresis               2   mins 47122    Timed Treatment:   33   mins   Total Treatment:     53   mins    Gianna Sheffield, VASHTI  Physical Therapist

## 2025-04-02 ENCOUNTER — APPOINTMENT (OUTPATIENT)
Facility: CLINIC | Age: 45
End: 2025-04-02
Payer: COMMERCIAL

## 2025-04-03 ENCOUNTER — OFFICE VISIT (OUTPATIENT)
Dept: CARDIOLOGY | Facility: HOSPITAL | Age: 45
End: 2025-04-03
Payer: COMMERCIAL

## 2025-04-03 VITALS
DIASTOLIC BLOOD PRESSURE: 60 MMHG | WEIGHT: 180 LBS | SYSTOLIC BLOOD PRESSURE: 101 MMHG | OXYGEN SATURATION: 94 % | HEART RATE: 89 BPM | BODY MASS INDEX: 25.77 KG/M2 | HEIGHT: 70 IN

## 2025-04-03 DIAGNOSIS — I42.8 OTHER CARDIOMYOPATHY: Primary | ICD-10-CM

## 2025-04-03 DIAGNOSIS — Z86.79 HISTORY OF ATRIAL FIBRILLATION: ICD-10-CM

## 2025-04-03 LAB
ATRIAL RATE: 357 BPM
Q ONSET: 219 MS
QRS COUNT: 16 BEATS
QRS DURATION: 90 MS
QT INTERVAL: 338 MS
QTC CALCULATION(BAZETT): 438 MS
QTC FREDERICIA: 402 MS
R AXIS: 54 DEGREES
T AXIS: -41 DEGREES
T OFFSET: 388 MS
VENTRICULAR RATE: 101 BPM

## 2025-04-03 PROCEDURE — 99215 OFFICE O/P EST HI 40 MIN: CPT | Performed by: INTERNAL MEDICINE

## 2025-04-03 PROCEDURE — 93005 ELECTROCARDIOGRAM TRACING: CPT | Performed by: INTERNAL MEDICINE

## 2025-04-03 PROCEDURE — 1036F TOBACCO NON-USER: CPT | Performed by: INTERNAL MEDICINE

## 2025-04-03 PROCEDURE — 3008F BODY MASS INDEX DOCD: CPT | Performed by: INTERNAL MEDICINE

## 2025-04-03 NOTE — PROGRESS NOTES
"Chief Complaint:   No chief complaint on file.     History Of Present Illness:    Oswald Montilla is a 45 y.o. male here for followup. He has a history of pre-diabetes and atrial fibrillation with RVR diagnosed 3/2019. He was also noted to have a cardiomyopathy though without kamilah HF or volume overload. His EF was 15%-20. Cardiac MRI was unrevealing for a separate etiology of his heart dysfunction. He did not receive diuretics. The most likely cause of his cardiomyopathy was felt to be a tachy mediated process. He had no ischemic symptoms. He was started on rate control therapies with improvement and later resolution of his symptoms. Cardioversion was attempted but he went back into atrial fibrillation. He was started on amiodarone with a load and 10 days later underwent a successful cardioversion though with later recurrence. Underwent aFib ablation later in 2019 with recurrent afib noted on followup followed by another cardioversion 9/2019 with no apparent recurrence thereafter. Last echocardiogram showed an improvement of his EF to 35-40% (7/2020).     He reports marked exertional fatigue since 12/2024. Also has been vomiting after eating breakfast. He was found to have an irregular rhythm late last month and was later started on Toprol and Eliquis for atrial fibrillation. He reports no change in his symptoms. He is presently wearing an event monitor and had a TTE ordered.      Last Recorded Vitals:  Vitals:    04/03/25 1441   BP: 101/60   Pulse: 89   SpO2: 94%   Weight: 81.6 kg (180 lb)   Height: 1.778 m (5' 10\")             Allergies:  Patient has no known allergies.    Outpatient Medications:  Current Outpatient Medications   Medication Instructions    apixaban (ELIQUIS) 5 mg, oral, 2 times daily    ciclopirox 1 % shampoo 1 Dose, Topical, Every other day    metoprolol succinate XL (TOPROL-XL) 25 mg, oral, Daily, Do not crush or chew.    metoprolol succinate XL (TOPROL-XL) 50 mg, oral, Daily, Do not crush or " chew.    sildenafil (VIAGRA) 50 mg, oral, Daily PRN    valACYclovir (Valtrex) 1 gram tablet As needed         Physical Exam:  Gen Well appearing adult male sitting up in NAD. Body mass index is 25.83 kg/m².   CV irregularly irregular. No m/r/g appreciated. No JVD or leg edema.  Pulm Lungs clear with normal respiratory effort.  Neuro Alert and conversant. Grossly nonfocal.       I reviewed the patient's ECG from 3/28/2025 - atrial fibrillation @ 94 bpm. NS T wave abnormality     I reviewed most recent imaging / labs / and office notes       Assessment/Plan   1. Atrial fibrillation  Recurrent and symptomatic. Rates variable but fair on Toprol. On anticoagulation with Eliquis. Will perform a ZO-DCCV next week and send to EP for consideration of a repeat ablation.     2. Cardiomyopathy   History of. Poorly tolerant of GDMT and persisted despite heart rate improvement (though it did improve somewhat). Has a repeat TTE pending.       Follow-up 4-6 weeks        Benigno Sy MD

## 2025-04-07 ENCOUNTER — TELEPHONE (OUTPATIENT)
Dept: CARDIOLOGY | Facility: HOSPITAL | Age: 45
End: 2025-04-07

## 2025-04-08 ENCOUNTER — ANESTHESIA EVENT (OUTPATIENT)
Dept: CARDIOLOGY | Facility: HOSPITAL | Age: 45
End: 2025-04-08
Payer: COMMERCIAL

## 2025-04-08 ENCOUNTER — HOSPITAL ENCOUNTER (OUTPATIENT)
Dept: CARDIOLOGY | Facility: HOSPITAL | Age: 45
Discharge: HOME | End: 2025-04-08
Payer: COMMERCIAL

## 2025-04-08 ENCOUNTER — ANESTHESIA (OUTPATIENT)
Dept: CARDIOLOGY | Facility: HOSPITAL | Age: 45
End: 2025-04-08
Payer: COMMERCIAL

## 2025-04-08 VITALS
BODY MASS INDEX: 25.83 KG/M2 | SYSTOLIC BLOOD PRESSURE: 106 MMHG | RESPIRATION RATE: 14 BRPM | TEMPERATURE: 97.2 F | OXYGEN SATURATION: 95 % | HEART RATE: 61 BPM | WEIGHT: 180 LBS | DIASTOLIC BLOOD PRESSURE: 67 MMHG

## 2025-04-08 DIAGNOSIS — Z86.79 HISTORY OF ATRIAL FIBRILLATION: ICD-10-CM

## 2025-04-08 DIAGNOSIS — I48.91 UNSPECIFIED ATRIAL FIBRILLATION (MULTI): ICD-10-CM

## 2025-04-08 LAB
ATRIAL RATE: 115 BPM
EJECTION FRACTION: 40 %
Q ONSET: 218 MS
QRS COUNT: 18 BEATS
QRS DURATION: 90 MS
QT INTERVAL: 338 MS
QTC CALCULATION(BAZETT): 452 MS
QTC FREDERICIA: 411 MS
R AXIS: 53 DEGREES
T AXIS: -64 DEGREES
T OFFSET: 387 MS
VENTRICULAR RATE: 108 BPM

## 2025-04-08 PROCEDURE — 92960 CARDIOVERSION ELECTRIC EXT: CPT | Performed by: INTERNAL MEDICINE

## 2025-04-08 PROCEDURE — 3700000001 HC GENERAL ANESTHESIA TIME - INITIAL BASE CHARGE

## 2025-04-08 PROCEDURE — A93312 PR ECHO HEART,TRANSESOPHAGEAL,COMPLETE: Performed by: ANESTHESIOLOGIST ASSISTANT

## 2025-04-08 PROCEDURE — 93312 ECHO TRANSESOPHAGEAL: CPT | Performed by: INTERNAL MEDICINE

## 2025-04-08 PROCEDURE — 93325 DOPPLER ECHO COLOR FLOW MAPG: CPT | Performed by: INTERNAL MEDICINE

## 2025-04-08 PROCEDURE — 93320 DOPPLER ECHO COMPLETE: CPT | Performed by: INTERNAL MEDICINE

## 2025-04-08 PROCEDURE — 93005 ELECTROCARDIOGRAM TRACING: CPT | Mod: 59

## 2025-04-08 PROCEDURE — 2500000004 HC RX 250 GENERAL PHARMACY W/ HCPCS (ALT 636 FOR OP/ED): Performed by: ANESTHESIOLOGIST ASSISTANT

## 2025-04-08 PROCEDURE — 93320 DOPPLER ECHO COMPLETE: CPT

## 2025-04-08 PROCEDURE — A93312 PR ECHO HEART,TRANSESOPHAGEAL,COMPLETE: Performed by: STUDENT IN AN ORGANIZED HEALTH CARE EDUCATION/TRAINING PROGRAM

## 2025-04-08 PROCEDURE — 3700000002 HC GENERAL ANESTHESIA TIME - EACH INCREMENTAL 1 MINUTE

## 2025-04-08 PROCEDURE — 2500000005 HC RX 250 GENERAL PHARMACY W/O HCPCS: Performed by: INTERNAL MEDICINE

## 2025-04-08 PROCEDURE — 93010 ELECTROCARDIOGRAM REPORT: CPT | Performed by: INTERNAL MEDICINE

## 2025-04-08 RX ORDER — PHENYLEPHRINE HCL IN 0.9% NACL 1 MG/10 ML
SYRINGE (ML) INTRAVENOUS AS NEEDED
Status: DISCONTINUED | OUTPATIENT
Start: 2025-04-08 | End: 2025-04-08

## 2025-04-08 RX ORDER — PROPOFOL 10 MG/ML
INJECTION, EMULSION INTRAVENOUS AS NEEDED
Status: DISCONTINUED | OUTPATIENT
Start: 2025-04-08 | End: 2025-04-08

## 2025-04-08 RX ORDER — LIDOCAINE HYDROCHLORIDE 20 MG/ML
SOLUTION OROPHARYNGEAL AS NEEDED
Status: DISCONTINUED | OUTPATIENT
Start: 2025-04-08 | End: 2025-04-08 | Stop reason: HOSPADM

## 2025-04-08 RX ADMIN — PROPOFOL 20 MG: 10 INJECTION, EMULSION INTRAVENOUS at 13:41

## 2025-04-08 RX ADMIN — SODIUM CHLORIDE: 9 INJECTION, SOLUTION INTRAVENOUS at 13:29

## 2025-04-08 RX ADMIN — PROPOFOL 50 MG: 10 INJECTION, EMULSION INTRAVENOUS at 13:40

## 2025-04-08 RX ADMIN — BENZOCAINE, BUTAMBEN, AND TETRACAINE HYDROCHLORIDE 2 SPRAY: .028; .004; .004 AEROSOL, SPRAY TOPICAL at 13:41

## 2025-04-08 RX ADMIN — PROPOFOL 30 MG: 10 INJECTION, EMULSION INTRAVENOUS at 13:46

## 2025-04-08 RX ADMIN — Medication 100 MCG: at 13:40

## 2025-04-08 RX ADMIN — PROPOFOL 20 MG: 10 INJECTION, EMULSION INTRAVENOUS at 13:50

## 2025-04-08 RX ADMIN — PROPOFOL 30 MG: 10 INJECTION, EMULSION INTRAVENOUS at 13:48

## 2025-04-08 RX ADMIN — PROPOFOL 20 MG: 10 INJECTION, EMULSION INTRAVENOUS at 13:52

## 2025-04-08 RX ADMIN — PROPOFOL 65 MG: 10 INJECTION, EMULSION INTRAVENOUS at 13:43

## 2025-04-08 RX ADMIN — PROPOFOL 20 MG: 10 INJECTION, EMULSION INTRAVENOUS at 13:53

## 2025-04-08 RX ADMIN — LIDOCAINE HYDROCHLORIDE 15 ML: 20 SOLUTION ORAL; TOPICAL at 13:41

## 2025-04-08 RX ADMIN — PROPOFOL 50 MG: 10 INJECTION, EMULSION INTRAVENOUS at 13:45

## 2025-04-08 RX ADMIN — PROPOFOL 15 MG: 10 INJECTION, EMULSION INTRAVENOUS at 13:42

## 2025-04-08 ASSESSMENT — PAIN - FUNCTIONAL ASSESSMENT
PAIN_FUNCTIONAL_ASSESSMENT: 0-10

## 2025-04-08 ASSESSMENT — PAIN SCALES - GENERAL
PAINLEVEL_OUTOF10: 0 - NO PAIN

## 2025-04-08 ASSESSMENT — ENCOUNTER SYMPTOMS
MUSCULOSKELETAL NEGATIVE: 1
FATIGUE: 1
CARDIOVASCULAR NEGATIVE: 1
ALLERGIC/IMMUNOLOGIC NEGATIVE: 1
ENDOCRINE NEGATIVE: 1
GASTROINTESTINAL NEGATIVE: 1
EYES NEGATIVE: 1
HEMATOLOGIC/LYMPHATIC NEGATIVE: 1
NEUROLOGICAL NEGATIVE: 1
PSYCHIATRIC NEGATIVE: 1
RESPIRATORY NEGATIVE: 1

## 2025-04-08 ASSESSMENT — COLUMBIA-SUICIDE SEVERITY RATING SCALE - C-SSRS
1. IN THE PAST MONTH, HAVE YOU WISHED YOU WERE DEAD OR WISHED YOU COULD GO TO SLEEP AND NOT WAKE UP?: NO
6. HAVE YOU EVER DONE ANYTHING, STARTED TO DO ANYTHING, OR PREPARED TO DO ANYTHING TO END YOUR LIFE?: NO
2. HAVE YOU ACTUALLY HAD ANY THOUGHTS OF KILLING YOURSELF?: NO

## 2025-04-08 NOTE — ANESTHESIA PREPROCEDURE EVALUATION
Patient: Oswald Montilla    Procedure Information       Date/Time: 04/08/25 1330    Scheduled providers: Bneigno Sy MD    Procedure: TRANSESOPHAGEAL ECHO (ZO) W/ POSSIBLE CARDIOVERSION    [facility]          CONCLUSIONS:   1. The left ventricular systolic function is moderately decreased with a 35-40% estimated ejection fraction.   2. The left atrium is moderately dilated.   3. There is global hypokinesis of the left ventricle with minor regional variations.    Relevant Problems   Cardiac   (+) Atrial fibrillation, unspecified type (Multi)      Pulmonary   (+) HERNANDEZ (dyspnea on exertion)      Neuro   (+) Depression due to head injury   (+) MANDY (generalized anxiety disorder)      ID   (+) Herpes gladiatorum   (+) Herpes simplex       Clinical information reviewed:    Allergies  Meds               NPO Detail:  NPO/Void Status  Date of Last Liquid: 04/07/25  Date of Last Solid: 04/07/25         Physical Exam    Airway  Mallampati: II  TM distance: >3 FB  Neck ROM: full     Cardiovascular   Rhythm: regular  Rate: normal     Dental    Pulmonary   Breath sounds clear to auscultation     Abdominal            Anesthesia Plan    History of general anesthesia?: yes  History of complications of general anesthesia?: no    ASA 3     MAC     intravenous induction   Anesthetic plan and risks discussed with patient.    Plan discussed with CRNA.

## 2025-04-08 NOTE — H&P
History Of Present Illness  Oswald Montilla is a 45 y.o. male presenting with atrial fibrillation, here for ZO +/- DCCV. PMHx significant for pre-diabetes, Afib (dx 3/2019), cardiomyopathy (LVEF 35-40%, mod dilated LA on TTE 7/2020).     He was found to have recurrence of Afib at 3/31/25 Cardiology visit and was started on Eliquis, Metoprolol Succinate.     Past Medical History:  Past Medical History:   Diagnosis Date    Other conditions influencing health status     Traumatic brain injury with prolonged (more than 24 hours) loss of consciousness    Personal history of other diseases of the nervous system and sense organs     History of diplopia        Past Surgical History:  Past Surgical History:   Procedure Laterality Date    APPENDECTOMY  10/09/2017    Appendectomy    OTHER SURGICAL HISTORY  10/09/2017    Eye Surgery Results [facility]          Social History:  Social History     Tobacco Use    Smoking status: Never     Passive exposure: Never    Smokeless tobacco: Never   Vaping Use    Vaping status: Never Used   Substance Use Topics    Alcohol use: Not Currently     Comment: has quit 3 yrs ago- updated 3/4/25    Drug use: Yes     Types: Marijuana     Comment: medical card       Family History:  Family History   Problem Relation Name Age of Onset    Heart attack Mother      Colon cancer Father      Diabetes Father      Other (CIRCULATION PROBLEM) Father      Coronary artery disease Father      Heart attack Father's Brother          Allergies:  No Known Allergies     Home Medications:  Current Outpatient Medications   Medication Instructions    apixaban (ELIQUIS) 5 mg, oral, 2 times daily    ciclopirox 1 % shampoo 1 Dose, Topical, Every other day    metoprolol succinate XL (TOPROL-XL) 25 mg, oral, Daily, Do not crush or chew.    sildenafil (VIAGRA) 50 mg, oral, Daily PRN    valACYclovir (Valtrex) 1 gram tablet As needed       Inpatient Medications:  Scheduled medications   Medication Dose Route Frequency     PRN  medications   Medication    butamben-tetracaine-benzocaine    lidocaine    oxygen     Continuous Medications   Medication Dose Last Rate         Review of Systems   Constitutional:  Positive for fatigue.   HENT: Negative.     Eyes: Negative.    Respiratory: Negative.     Cardiovascular: Negative.    Gastrointestinal: Negative.    Endocrine: Negative.    Genitourinary: Negative.    Musculoskeletal: Negative.    Skin: Negative.    Allergic/Immunologic: Negative.    Neurological: Negative.    Hematological: Negative.    Psychiatric/Behavioral: Negative.            Physical Exam  General:  Patient is awake, alert, and oriented.  Patient is in no acute distress.  HEENT:  Pupils equal and reactive.  Normocephalic.  Moist mucosa.    Neck:  No JVD.   Cardiovascular:  IRR. No murmurs/rubs/gallops. Radial pulses 2+.   Pulmonary:  Clear to auscultation bilaterally.  Abdomen:  Soft. Non-tender.   Non-distended.  Positive bowel sounds.  Lower Extremities:  Pedal pulses 2+ No LE edema.  Neurologic:  Cranial nerves II-XII grossly intact.   No focal deficit.   Skin: Skin warm and dry, no lesions. Normal skin turgor.   Psychiatric: Normal affect.     Sedation Plan    ASA 3     Mallampati class: II.    Risks, benefits, and alternatives discussed with patient.         NPO since 0000    Last Recorded Vitals  Blood pressure 119/66, pulse 99, temperature 36.2 °C (97.2 °F), resp. rate 18, weight 81.6 kg (180 lb), SpO2 94%.         Vitals from the Past 24 Hours  Heart Rate:  []   Temp:  [36.2 °C (97.2 °F)]   Resp:  [18-20]   BP: (119)/(66)   Weight:  [81.6 kg (180 lb)]   SpO2:  [94 %-96 %]          Relevant Results    Labs    POCT Glucose:      POCT Urine Pregnancy:       CBC:   Recent Labs     03/05/25  0700 06/07/23  0913 12/17/20  0902 09/11/19  1657 05/31/19  1104 03/12/19  0512 03/11/19  0531   WBC 4.7 4.7  --  7.5 5.7 8.7 9.9   HGB 15.1 14.3  --  13.3* 15.0 14.0 12.8*   HCT 46.7 44.9 44.2 42.0 47.2 43.5 40.1*    241  --   "242 317 288 281   MCV 82.5 83  --  88 85 83 84     BMP/CMP:   Recent Labs     03/05/25  0700 06/07/23  0913 09/11/19  1657 05/31/19  1104 03/12/19  0512 03/11/19  0531 03/10/19  2128    139 135* 136 134* 134* 132*   K 4.9 4.6 4.4 5.6* 4.1 4.7 4.3    102 100 98 101 100 99   BUN 21 25* 15 23 14 14 17   CREATININE 0.73 0.73 0.70 0.69 0.76 0.74 0.73   CO2 27 26 27 28 25 27 26   CALCIUM 9.0 9.5 9.0 10.0 8.4* 8.6 8.7   PROT 7.2 7.0  --   --   --   --  6.3*   BILITOT 0.8 1.1  --   --   --   --  1.0   ALKPHOS 82 86  --   --   --   --  85   ALT 17 20  --   --   --   --  24   AST 20 24  --   --   --   --  16   GLUCOSE 99 76 101* 83 101* 102* 106*      Magnesium: No results for input(s): \"MG\" in the last 51193 hours.  Lipid Panel:   Recent Labs     06/07/23  0913 12/17/20  0902 03/11/19  0531   CHOL 160 163 113   HDL 52.8 39.4* 37.5*   CHHDL 3.0 4.1 3.0   VLDL 8 11 10   TRIG 39 54 48     Cardiac       No lab exists for component: \"CK\", \"CKMBP\"   Hemoglobin A1C:   Recent Labs     03/05/25  0700 06/07/23  0913   HGBA1C 6.2* 5.7*     TSH/ Free T4:   Recent Labs     03/05/25  0700 06/07/23  0913 11/18/20  1322 03/11/19  0531   TSH 1.23 0.79 0.91 1.92     Iron:   Recent Labs     03/13/19  0548   FERRITIN 68     Coag:     ABO: No results found for: \"ABO\"    Past Cardiology Tests (Last 3 Years):    EKG:  Recent Labs     04/03/25  1445 06/15/22  1150 06/16/21  1143 10/11/19  1100   ATRRATE 357 71 63 79   VENTRATE 101 71 63 79   PRINT  --  162 162 130   QRSDUR 90 90 94 94   QTCFRED 402 416 396 443   QTCCALCB 438 428 399 463     Encounter Date: 04/03/25   ECG 12 lead (Clinic Performed)   Result Value    Ventricular Rate 101    Atrial Rate 357    QRS Duration 90    QT Interval 338    QTC Calculation(Bazett) 438    R Axis 54    T Axis -41    QRS Count 16    Q Onset 219    T Offset 388    QTC Fredericia 402    Narrative    Atrial fibrillation with rapid ventricular response  T wave abnormality, consider inferior ischemia or " digitalis effect  Abnormal ECG  When compared with ECG of 15-MINDI-2022 11:50,  Atrial fibrillation has replaced Sinus rhythm  Non-specific change in ST segment in Inferior leads     Echo:  Echocardiogram:   Echocardiogram     Narrative  [facility], [address] [place] [address]  Tel [phone] and Fax [phone]    TRANSTHORACIC ECHOCARDIOGRAM REPORT      Patient Name:     NEVAEH SRIVASTAVA Reading Physician:   [id number] Benigno Sy MD  Study Date:       7/1/2020        Referring Physician: Benigno Sy MD  MRN/PID:          [id number]        PCP:                 [id number] Wills DO  Accession/Order#: [id number]    Department Location: [facility]  YOB: 1980       Fellow:  Gender:           M               Nurse:  Admit Date:                       Sonographer:         Ara DOZIER  Admission Status: Outpatient      Additional Staff:  Height:           180.30 cm       CC Report to:  Weight:           77.10 kg        Study Type:          Echocardiogram  BSA:              1.97 m2  Blood Pressure: 103 /64 mmHg    Diagnosis/ICD: I42.9-Cardiomyopathy, unspecified  Indication:    Cardiomyopathy  Procedure/CPT: Echo Limited-29704; Color Doppler-74550; Doppler Limited-63826    Patient History:  Pertinent History: Cardiomyopathy and A-Fib.    Study Detail: The following Echo studies were performed: 2D, M-Mode, Doppler and  color flow. Technically challenging study due to prominent lung  artifact. Unable to obtain suprasternal notch view. A bubble study  was not performed.      PHYSICIAN INTERPRETATION:  Left Ventricle: The left ventricular systolic function is moderately decreased, with an estimated ejection fraction of 35-40%. There is global hypokinesis of the left ventricle with minor regional variations. The left ventricular cavity size is upper limits of normal. Spectral Doppler shows a normal pattern of left ventricular diastolic filling. There are normal left  ventricular filling pressures.  Left Atrium: The left atrium is moderately dilated.  Right Ventricle: The right ventricle was not assessed. There is normal right ventricular global systolic function.  Right Atrium: The right atrium is mildly dilated.  Aortic Valve: The aortic valve is trileaflet. There is no evidence of aortic valve regurgitation.  Mitral Valve: The mitral valve is normal in structure. There is trace mitral valve regurgitation.  Tricuspid Valve: The tricuspid valve is structurally normal. There is trace tricuspid regurgitation.  Pulmonic Valve: The pulmonic valve is not well visualized. There is no indication of pulmonic valve regurgitation.  Pericardium: There is no pericardial effusion noted.  Aorta: The aortic root is normal.  Systemic Veins: The inferior vena cava appears to be of normal size. There is IVC inspiratory collapse greater than 50%.  In comparison to the previous echocardiogram(s): Compared with study from 12/19/2019, there is a mild improvement to the ejection fraction.      CONCLUSIONS:  1. The left ventricular systolic function is moderately decreased with a 35-40% estimated ejection fraction.  2. The left atrium is moderately dilated.  3. There is global hypokinesis of the left ventricle with minor regional variations.    QUANTITATIVE DATA SUMMARY:  2D MEASUREMENTS:  Normal Ranges:  LAs:           4.22 cm   (2.7-4.0cm)  IVSd:          0.71 cm   (0.6-1.1cm)  LVPWd:         0.69 cm   (0.6-1.1cm)  LVIDd:         5.83 cm   (3.9-5.9cm)  LVIDs:         4.32 cm  LV Mass Index: 76.0 g/m2  LV % FS        25.8 %    LA VOLUME:  Normal Ranges:  LA Vol A4C:        51.3 ml    (22+/-6mL/m2)  LA Vol A2C:        98.7 ml  LA Vol BP:         71.9 ml  LA Vol Index A4C:  26.1 ml/m2  LA Vol Index A2C:  50.2 ml/m2  LA Vol Index BP:   36.5 ml/m2  LA Area A4C:       17.2 cm2  LA Area A2C:       24.1 cm2  LA Major Axis A4C: 4.9 cm  LA Major Axis A2C: 5.0 cm  LA Volume Index:   36.3 ml/m2  LA Vol A4C:       "  42.5 ml  LA Vol A2C:        88.5 ml    M-MODE MEASUREMENTS:  Normal Ranges:  Ao Root: 2.90 cm (2.0-3.7cm)    LV SYSTOLIC FUNCTION BY 2D PLANIMETRY (MOD):  Normal Ranges:  EF-A4C View: 37.5 % (>55%)  EF-A2C View: 50.7 %  EF-Biplane:  45.2 %    LV DIASTOLIC FUNCTION:  Normal Ranges:  MV Peak E:        0.42 m/s   (0.7-1.2 m/s)  MV Peak A:        0.43 m/s   (0.42-0.7 m/s)  E/A Ratio:        0.97       (1.0-2.2)  MV lateral e'     0.11 m/s  MV medial e'      0.09 m/s  MV A Dur:         69.20 msec  PulmV Sys Erlin:    27.89 cm/s  PulmV Triana Erlin:   43.09 cm/s  PulmV S/D Erlin:    0.65  PulmV A Revs Erlin: 30.35 cm/s  PulmV A Revs Dur: 85.35 msec    MITRAL VALVE:  Normal Ranges:  MV DT: 178 msec (150-240msec)    RIGHT VENTRICLE:  RV 1   4.5 cm  RV 2   3.3 cm  RV 3   7.9 cm  TAPSE: 20.0 mm  RV s'  0.10 m/s    TRICUSPID VALVE/RVSP:  Normal Ranges:  Est. RA Pressure: 3 mmHg.    Pulmonary Veins:  PulmV A Revs Dur: 85.35 msec  PulmV A Revs Erlin: 30.35 cm/s  PulmV Triana Erlin:   43.09 cm/s  PulmV S/D Erlin:    0.65  PulmV Sys Erlin:    27.89 cm/s      [id number] Benigno Sy MD  Electronically signed on 7/1/2020 at 12:54:26 PM            Ejection Fractions:  No results found for: \"EF\"  Cath:  Coronary Angiography: No results found for this or any previous visit from the past 1800 days.    Right Heart Cath: No results found for this or any previous visit from the past 1800 days.    Stress Test:  Nuclear:No results found for this or any previous visit from the past 1800 days.    Metabolic Stress: No results found for this or any previous visit from the past 1800 days.    Cardiac Imaging:  Cardiac Scoring: No results found for this or any previous visit from the past 1800 days.    Cardiac MRI: No results found for this or any previous visit from the past 1800 days.         Assessment/Plan  Assessment/Plan   Assessment & Plan  History of atrial fibrillation        -TTE +/- DCCV with Dr. Sy today.        NP discussed with Dr. Sy regarding " plan of care/ discharge plan      I spent 30 minutes in the professional and overall care of this patient.      Pura Hernandez, APRN-CNP

## 2025-04-08 NOTE — DISCHARGE INSTRUCTIONS
Transesophageal Echocardiogram:  Post-Procedure and Homegoing Instructions      Follow up with Dr. Quintanilla and Dr. Sy as scheduled.   Complete echocardiogram as scheduled 4/15/25.     Please follow the instructions below after your transesophageal echocardiogram:  1) Do not drive or operate machinery for 24 hours after your procedure.  2) Do not eat or drink anything for two hours after your procedure, as directed by nurse.  Start with a little bit of water to be sure you can swallow without coughing.  3) Avoid alcohol for at least 24 hours after the test.  5) You may have a mild sore throat for a day or two. Cough drops may help after the  two hour wait. Tylenol may also be taken once you can swallow.  6) Although the symptoms below are rare, call your doctor at once, or seek emergency  care if you have:  - Bright red blood in your sputum - Sustained chest pain  - Severe shortness of breath - Severe abdominal pain  - Allergy symptoms (hives, rash, nausea, vomiting, difficulty breathing  7) If you are an out-patient, a nurse will have placed an IV during the study for sedation  and contrast injection. The nurse will remove the IV before she sends you home. Leave  the band-aid on for 24 hours and then check the IV site for signs of infection, such as:  ? Increasing soreness  ? Redness  ? Drainage from the puncture site    If you notice any of these conditions, you should contact your doctor immediately.    Please do not miss any doses of your anticoagulant (Eliquis). This is very important to prevent the risk of stroke.          Cardioversion     Cardioversion is a non-surgical way to restore your heart's normal rhythm. Medication may be tried first to correct an irregular heartbeat, but if medicines do not work, electrical cardioversion may be needed. The electrical current should make your heartbeat normally again.      After the procedure-    Your heart rate, blood pressure and respirations will be checked  frequently by the nurse until you are fully alert.      When the patches are removed form your chest, you may notice redness, irritation, or itching similar to a mild sunburn. You may You may use over the counter hydrocortisone 1% cream and apply up to three times a day for any irritation to your skin on your chest.      Discharge information-   Have an adult with you to get you home from the hospital; you will not be allowed to drive for 24 hours after the procedure.      You may resume your usual routine after discharge.      Make sure you and your family understands how you are to take your medications. The doctor will tell you what to do with your cardiac medicines and your anti-coagulation medicines.      There is a small chance your irregular heartbeat may return. If you feel skipped beats, rapid heart rate, or chest tightness, call your doctor.

## 2025-04-08 NOTE — ANESTHESIA POSTPROCEDURE EVALUATION
Patient: Oswald Montilla    Procedure Summary       Date: 04/08/25 [facility]    Anesthesia Start: 1329 Anesthesia Stop: 1404    Procedure: TRANSESOPHAGEAL ECHO (ZO) W/ POSSIBLE CARDIOVERSION Diagnosis:       History of atrial fibrillation      (atrial fibrillation)    Scheduled Providers: Benigno Sy MD Responsible Provider: Israel Lino MD    Anesthesia Type: MAC ASA Status: 3            Anesthesia Type: MAC    BP: 119/66 Pulse: 99   Resp: 18 SpO2: 94       Anesthesia Post Evaluation    Patient location during evaluation: bedside  Patient participation: complete - patient participated  Level of consciousness: awake  Pain management: adequate  Multimodal analgesia pain management approach  Airway patency: patent  Cardiovascular status: stable  Respiratory status: spontaneous ventilation and unassisted  Hydration status: acceptable  Postoperative Nausea and Vomiting: none  Comments: No significant PONV.        No notable events documented.

## 2025-04-09 ENCOUNTER — APPOINTMENT (OUTPATIENT)
Dept: PRIMARY CARE | Facility: CLINIC | Age: 45
End: 2025-04-09
Payer: COMMERCIAL

## 2025-04-09 VITALS
HEART RATE: 59 BPM | WEIGHT: 177.4 LBS | DIASTOLIC BLOOD PRESSURE: 64 MMHG | SYSTOLIC BLOOD PRESSURE: 100 MMHG | BODY MASS INDEX: 24.84 KG/M2 | OXYGEN SATURATION: 96 % | HEIGHT: 71 IN | TEMPERATURE: 97.1 F

## 2025-04-09 DIAGNOSIS — Z12.11 ENCOUNTER FOR SCREENING FOR MALIGNANT NEOPLASM OF COLON: ICD-10-CM

## 2025-04-09 DIAGNOSIS — N52.9 ERECTILE DYSFUNCTION, UNSPECIFIED ERECTILE DYSFUNCTION TYPE: ICD-10-CM

## 2025-04-09 DIAGNOSIS — I48.91 ATRIAL FIBRILLATION, UNSPECIFIED TYPE (MULTI): ICD-10-CM

## 2025-04-09 DIAGNOSIS — R73.03 PREDIABETES: ICD-10-CM

## 2025-04-09 DIAGNOSIS — Z00.00 ROUTINE GENERAL MEDICAL EXAMINATION AT A HEALTH CARE FACILITY: Primary | ICD-10-CM

## 2025-04-09 DIAGNOSIS — I42.8 OTHER CARDIOMYOPATHY: ICD-10-CM

## 2025-04-09 PROCEDURE — 99214 OFFICE O/P EST MOD 30 MIN: CPT | Performed by: FAMILY MEDICINE

## 2025-04-09 PROCEDURE — 3008F BODY MASS INDEX DOCD: CPT | Performed by: FAMILY MEDICINE

## 2025-04-09 PROCEDURE — 1036F TOBACCO NON-USER: CPT | Performed by: FAMILY MEDICINE

## 2025-04-09 PROCEDURE — 99396 PREV VISIT EST AGE 40-64: CPT | Performed by: FAMILY MEDICINE

## 2025-04-09 RX ORDER — TADALAFIL 20 MG/1
20 TABLET ORAL DAILY PRN
Qty: 12 TABLET | Refills: 3 | Status: SHIPPED | OUTPATIENT
Start: 2025-04-09 | End: 2026-04-09

## 2025-04-09 ASSESSMENT — ENCOUNTER SYMPTOMS
DEPRESSION: 0
OCCASIONAL FEELINGS OF UNSTEADINESS: 0

## 2025-04-09 NOTE — PATIENT INSTRUCTIONS
You can schedule via [employer] or if you prefer, you can call our  and she can help schedule the appointment for you.   Vicenta Hodgson  Patient   Referral Management  [phone]      Our regular  is on medical leave at this time and we have a  available on Tuesdays and Thursdays; if you were to call today, you can leave a message and she will return your call. Otherwise, you can schedule with the typical  appointment line or try on [facility]!      Schedule a Colonoscopy  Call [phone]  Endoscopy:  [phone] ([id number])    [facility]  [address] [place] [address]  [facility]  [address] [place] [id number]  [facility]  [address] [place] [id number]    [facility]   [address] [place] [address], [address] and in [place] at [address] [facility], [address]  [phone]    [facility]   [address], [address]  [place] [address] [phone]

## 2025-04-09 NOTE — PROGRESS NOTES
"Physical    Prediabetes 6.2  Hereditary? Not sure how his diet could be better - exercises not sure what to do    \"Heart went out of rhythm\"  Cardioverted yesterday - feeling better  Exercise a lot - couldn't anymore  Feeling fatigued, weak, SOB, cp, tightness, fluttering  Nausea - puking in the morning  Resolved as of now this morning    Wanted to talk about risks and benefits of testosterone - to be as strong as he possibly can - wants to know his options    Diet - loss a bit of weight  Stress - can't exercise - feelings worse     6 years ago  Can't see his daughter - stressed out    Repeat ablation plan - meeting 4/25    -no partner-  ED - Janak had no effect before - try something else?  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

## 2025-04-09 NOTE — PROGRESS NOTES
Assessment/Plan    Problem List Items Addressed This Visit       Cardiomyopathy    Relevant Medications    tadalafil 20 mg tablet    Erectile dysfunction    Relevant Medications    tadalafil 20 mg tablet    Other Relevant Orders    Referral to Urology    Atrial fibrillation, unspecified type (Multi)    Relevant Medications    tadalafil 20 mg tablet    Other Relevant Orders    Comprehensive metabolic panel    Prediabetes    Relevant Orders    Hemoglobin A1c     Other Visit Diagnoses       Routine general medical examination at a health care facility    -  Primary    Encounter for screening for malignant neoplasm of colon        Relevant Orders    Colonoscopy Screening; High Risk Patient; first degree relative w/ colon cancer.              Patient doing well from atrial fibrillation standpoint his heart is in regular rate and rhythm today.  He has upcoming follow-ups with cardiology.  Additionally he is due for colonoscopy his dad did passed from colon cancer in his 70s so that was ordered today because he has a lot going on with cardiology we discussed doing this in the fall.  Patient is also interested in evaluation for hypergonadism and erectile dysfunction referral to urology placed.  Otherwise patient is up-to-date on routine screenings.  Will follow-up in 6 months with lab work to reevaluate prediabetes.    Subjective   Patient ID: Oswald Montilla is a 45 y.o. male who presents for Annual Exam.  Pt states he feels the best he felt in months.   He typically uses exercise to help manage stress; anxiety has been exacerbated because of his limits to exercise.     In AM he was feeling nauseous     Recent a fib    1. Left ventricular ejection fraction is moderately decreased, by visual estimate at 40%.   2. There is global hypokinesis of the left ventricle with minor regional variations.   3. There is normal right ventricular global systolic function.   4. The left atrium is mildly dilated.   5. No left atrial  "thrombus.   6. Successful cardioversion for atrial fibrillation resulting in normal sinus rhythm.    Continues to follow w/ Cardiology   Cardiology - Dr. Sy     Social History     Tobacco Use   Smoking Status Never    Passive exposure: Never   Smokeless Tobacco Never        Screening Tests:   AAA Screening: not indicated.   Lung Cancer Screening: not indicated.   Colon Cancer Screening: orderd     Prostate Cancer Screening: No results found for: \"PSA\"    Objective   /64 (BP Location: Right arm, Patient Position: Sitting, BP Cuff Size: Adult)   Pulse 59   Temp 36.2 °C (97.1 °F) (Temporal)   Ht 1.795 m (5' 10.67\")   Wt 80.5 kg (177 lb 6.4 oz)   SpO2 96%   BMI 24.97 kg/m²     Physical Exam  Constitutional:       General: He is not in acute distress.     Appearance: Normal appearance. He is not ill-appearing, toxic-appearing or diaphoretic.   HENT:      Head: Normocephalic and atraumatic.   Eyes:      Extraocular Movements: Extraocular movements intact.      Pupils: Pupils are equal, round, and reactive to light.   Cardiovascular:      Rate and Rhythm: Normal rate and regular rhythm.      Pulses: Normal pulses.      Heart sounds: Normal heart sounds.   Pulmonary:      Effort: Pulmonary effort is normal.      Breath sounds: Normal breath sounds.   Neurological:      Mental Status: He is alert.         Clarice Saravia,      I spent a total of 30 minutes on the date of the service which included preparing to see the patient, face-to-face patient care, completing clinical documentation, performing a medically appropriate examination, counseling and educating the patient/family/caregiver and ordering medications, tests, or procedures.    "

## 2025-04-15 ENCOUNTER — ANCILLARY PROCEDURE (OUTPATIENT)
Facility: CLINIC | Age: 45
End: 2025-04-15
Payer: COMMERCIAL

## 2025-04-15 DIAGNOSIS — I51.81 STRESS-INDUCED CARDIOMYOPATHY: ICD-10-CM

## 2025-04-15 DIAGNOSIS — I48.91 ATRIAL FIBRILLATION, UNSPECIFIED TYPE (MULTI): ICD-10-CM

## 2025-04-15 DIAGNOSIS — R06.09 DOE (DYSPNEA ON EXERTION): ICD-10-CM

## 2025-04-15 LAB
AORTIC VALVE PEAK VELOCITY: 1.05 M/S
AV PEAK GRADIENT: 4 MMHG
AVA (PEAK VEL): 3.79 CM2
EJECTION FRACTION APICAL 4 CHAMBER: 51.6
EJECTION FRACTION: 55 %
LEFT ATRIUM VOLUME AREA LENGTH INDEX BSA: 33.5 ML/M2
LEFT VENTRICLE INTERNAL DIMENSION DIASTOLE: 5.89 CM (ref 3.5–6)
LEFT VENTRICULAR OUTFLOW TRACT DIAMETER: 2.38 CM
MITRAL VALVE E/A RATIO: 2.35
RIGHT VENTRICLE FREE WALL PEAK S': 9 CM/S
TRICUSPID ANNULAR PLANE SYSTOLIC EXCURSION: 2.4 CM

## 2025-04-15 PROCEDURE — 93306 TTE W/DOPPLER COMPLETE: CPT | Performed by: STUDENT IN AN ORGANIZED HEALTH CARE EDUCATION/TRAINING PROGRAM

## 2025-04-15 PROCEDURE — 93306 TTE W/DOPPLER COMPLETE: CPT

## 2025-04-22 NOTE — PROGRESS NOTES
Cardiac Electrophysiology Office Visit   I had the pleasure seeing Oswald Montilla    Current Outpatient Medications   Medication Instructions    apixaban (ELIQUIS) 5 mg, oral, 2 times daily    ciclopirox 1 % shampoo 1 Dose, Topical, Every other day    metoprolol succinate XL (TOPROL-XL) 25 mg, oral, Daily, Do not crush or chew.    tadalafil 20 mg, oral, Daily PRN    valACYclovir (Valtrex) 1 gram tablet As needed      Subjective    Oswald Montilla is a 45 y.o. male .        Chief Complaint   Patient presents with    Atrial Fibrillation     OUTPATIENT CONSULTATION: Cardiac Electrophysiology  DOS: 04/25/2025   REASON: Afib Evaluation and Treatment   REFERRING: Benigno Sy MD     HPI     Oswald Montilla has a past medical history of Depression, General Anxiety Disorder, Herpes Simplex, Prediabetes.  CARDIAC HISTORY:  AF - Index Dx 2019. S/p  ZO/CVN in 03/2019. He reverted back to AF and underwent PVI 09/2019, with noted recurrent AF on follow up followed by another DCCV. Over the past couple of months he has become more fatigued and SOB. Underwent another ZO/DCCV on 04/08/25 with success. In 2019  during PVI LA voltage map performed during AF. No areas of low voltage/scar noted outside PV's. Anatomy significant for 3 pulmonary veins on each side. B/L WACA lesions with anastasia line between LIPV and LMPV and double anastasia lines on right side. Catheter taken to right side, additional lesions placed on septal aspect of SVC/RA junction where marked fractionation and short CL of AF noted. DCCV performed next x1. Catheter taken back to left side, PV isolation confirmed with PentaRay catheter. He tolerated the procedure well with no immediate complications. . However he is was in AF and on 09/13/2019 underwent ZO guided cardioversion.  Cardiomyopathy - Was thought to be related to his AF with RVR. 2019 EF 20%    [id number] Score  Age <65: 0   Sex Male: 0   CHF History No: 0   HTN No: 0   Stroke/TIA/Thromboembolism  No: 0   Vascular Dz: CAD/PAD/Aortic Plaque No: 0   DM No: 0   Total Score 0     RELEVANT TESTING:     Transthoracic echo (TTE) complete 04/15/2025   CONCLUSIONS:   1. Left ventricular ejection fraction is normal, calculated by Richardson's biplane at 55%.   2. The left atrium is mildly dilated.   3. Compared with study dated 4/8/2025, there is significant increase in calculated LVEF from 40% on prior transesophageal echocardiogram prior to cardioversion and now up to 55%.    Event Monitor Preventice 14 Days 3/31/25-4/14/25  *The predominant rhythm was Atrial Fibrillation/Flutter  *The Maximum Heart Rate recorded was 201 bpm, 04/07 18:52:56, the Minimum Heart Rate recorded was 43 bpm, 04/04 07:59:29, and the Average Heart Rate was 94 bpm.  *There were 400 VE beats with a burden of <1 %.  *The study included an Atrial Fibrillation/Flutter [place] of 100 % with 33 % in RVR and <1 % in SVR. The longest episode was 8d 0h 48m 21.2s, 03/31 11:38:27, and the Fastest episode was 201 bpm, 03/31 11:38:27.  *There were 0 Patient Triggers.    Lab Review:   Lab Results   Component Value Date    WBC 4.7 03/05/2025    HGB 15.1 03/05/2025    HCT 46.7 03/05/2025     03/05/2025    CHOL 160 06/07/2023    TRIG 39 06/07/2023    HDL 52.8 06/07/2023    ALT 17 03/05/2025    AST 20 03/05/2025     03/05/2025    K 4.9 03/05/2025     03/05/2025    CREATININE 0.73 03/05/2025    BUN 21 03/05/2025    CO2 27 03/05/2025    TSH 1.23 03/05/2025    INR 1.3 (H) 05/31/2019    HGBA1C 6.2 (H) 03/05/2025       Review of Systems   All other systems reviewed and are negative.       Objective    Cardiovascular:      PMI at left midclavicular line. Normal rate. Regular rhythm. Normal S1. Normal S2.       Murmurs: There is no murmur.      No gallop.  No click. No rub.   Pulses:     Intact distal pulses.   Edema:     Peripheral edema absent.          Assessment/Plan     AF - back in 2019 resulted in severe cardiopmyopathy. After the PVI in June of  2019 had one recurrence, cardioverted in September of 2019 and was doing very well till December of 2024 when he clearly felt he is feeling worse. Was found to be in AF and cardioverted. Fortunately the EF is 55%. We discussed the recurrence of AF. Discussed the options . He does not want to take antiarrhythmic therapy. We are proceeding with PVI June 20, 2025

## 2025-04-23 ENCOUNTER — APPOINTMENT (OUTPATIENT)
Dept: CARDIOLOGY | Facility: HOSPITAL | Age: 45
End: 2025-04-23
Payer: COMMERCIAL

## 2025-04-23 LAB — BODY SURFACE AREA: 2 M2

## 2025-04-23 PROCEDURE — 93248 EXT ECG>7D<15D REV&INTERPJ: CPT | Performed by: INTERNAL MEDICINE

## 2025-04-25 ENCOUNTER — OFFICE VISIT (OUTPATIENT)
Dept: CARDIOLOGY | Facility: HOSPITAL | Age: 45
End: 2025-04-25
Payer: COMMERCIAL

## 2025-04-25 VITALS
DIASTOLIC BLOOD PRESSURE: 65 MMHG | SYSTOLIC BLOOD PRESSURE: 107 MMHG | WEIGHT: 183 LBS | HEIGHT: 70 IN | HEART RATE: 70 BPM | BODY MASS INDEX: 26.2 KG/M2

## 2025-04-25 DIAGNOSIS — I48.19 ATRIAL FIBRILLATION, PERSISTENT (MULTI): ICD-10-CM

## 2025-04-25 DIAGNOSIS — I48.91 ATRIAL FIBRILLATION, UNSPECIFIED TYPE (MULTI): Primary | ICD-10-CM

## 2025-04-25 LAB
ATRIAL RATE: 66 BPM
P AXIS: 41 DEGREES
P OFFSET: 190 MS
P ONSET: 136 MS
PR INTERVAL: 160 MS
Q ONSET: 216 MS
QRS COUNT: 11 BEATS
QRS DURATION: 96 MS
QT INTERVAL: 404 MS
QTC CALCULATION(BAZETT): 423 MS
QTC FREDERICIA: 417 MS
R AXIS: 45 DEGREES
T AXIS: -22 DEGREES
T OFFSET: 418 MS
VENTRICULAR RATE: 66 BPM

## 2025-04-25 PROCEDURE — 99205 OFFICE O/P NEW HI 60 MIN: CPT | Performed by: INTERNAL MEDICINE

## 2025-04-25 PROCEDURE — 93005 ELECTROCARDIOGRAM TRACING: CPT | Performed by: INTERNAL MEDICINE

## 2025-04-25 PROCEDURE — 99215 OFFICE O/P EST HI 40 MIN: CPT | Performed by: INTERNAL MEDICINE

## 2025-04-25 PROCEDURE — 3008F BODY MASS INDEX DOCD: CPT | Performed by: INTERNAL MEDICINE

## 2025-04-25 ASSESSMENT — COLUMBIA-SUICIDE SEVERITY RATING SCALE - C-SSRS
6. HAVE YOU EVER DONE ANYTHING, STARTED TO DO ANYTHING, OR PREPARED TO DO ANYTHING TO END YOUR LIFE?: NO
2. HAVE YOU ACTUALLY HAD ANY THOUGHTS OF KILLING YOURSELF?: NO
1. IN THE PAST MONTH, HAVE YOU WISHED YOU WERE DEAD OR WISHED YOU COULD GO TO SLEEP AND NOT WAKE UP?: NO

## 2025-04-25 ASSESSMENT — ENCOUNTER SYMPTOMS
OCCASIONAL FEELINGS OF UNSTEADINESS: 0
DEPRESSION: 0
LOSS OF SENSATION IN FEET: 0

## 2025-04-25 ASSESSMENT — PATIENT HEALTH QUESTIONNAIRE - PHQ9
SUM OF ALL RESPONSES TO PHQ9 QUESTIONS 1 AND 2: 0
2. FEELING DOWN, DEPRESSED OR HOPELESS: NOT AT ALL
1. LITTLE INTEREST OR PLEASURE IN DOING THINGS: NOT AT ALL

## 2025-04-28 ENCOUNTER — APPOINTMENT (OUTPATIENT)
Dept: CARDIOLOGY | Facility: CLINIC | Age: 45
End: 2025-04-28
Payer: COMMERCIAL

## 2025-05-01 ENCOUNTER — APPOINTMENT (OUTPATIENT)
Dept: CARDIOLOGY | Facility: HOSPITAL | Age: 45
End: 2025-05-01
Payer: COMMERCIAL

## 2025-05-08 ENCOUNTER — APPOINTMENT (OUTPATIENT)
Dept: UROLOGY | Facility: CLINIC | Age: 45
End: 2025-05-08
Payer: COMMERCIAL

## 2025-05-08 DIAGNOSIS — N52.9 ERECTILE DYSFUNCTION, UNSPECIFIED ERECTILE DYSFUNCTION TYPE: ICD-10-CM

## 2025-05-08 LAB
POC APPEARANCE, URINE: CLEAR
POC BILIRUBIN, URINE: NEGATIVE
POC BLOOD, URINE: NEGATIVE
POC COLOR, URINE: YELLOW
POC GLUCOSE, URINE: NEGATIVE MG/DL
POC KETONES, URINE: NEGATIVE MG/DL
POC LEUKOCYTES, URINE: NEGATIVE
POC NITRITE,URINE: NEGATIVE
POC PH, URINE: 5.5 PH
POC PROTEIN, URINE: NEGATIVE MG/DL
POC SPECIFIC GRAVITY, URINE: 1.01
POC UROBILINOGEN, URINE: 0.2 EU/DL

## 2025-05-08 PROCEDURE — 1036F TOBACCO NON-USER: CPT | Performed by: UROLOGY

## 2025-05-08 PROCEDURE — 81003 URINALYSIS AUTO W/O SCOPE: CPT | Performed by: UROLOGY

## 2025-05-08 PROCEDURE — 99203 OFFICE O/P NEW LOW 30 MIN: CPT | Performed by: UROLOGY

## 2025-05-08 NOTE — PROGRESS NOTES
05/08/2025  45-year-old gentleman concerning his testosterone level.  Some ED problems, voiding well    We discussed the testosterone No. 400, within normal range, no indication for replacement  We discussed erectile dysfunction  All the questions were answered, the patient expressed understanding and agreed to the plan.    Impression  ED  Testosterone check    Plan  Conservative management  Follow-up as needed    Chief Complaint   Patient presents with    Follow-up     Pt is here today for the hopes of getting his Testosterone checked due to his age. He denies any voiding issues at this time.        Physical Exam     TODAYS LAB RESULTS:  POCT UA Automated manually resulted  Order: [id number]   Status: Final result       Next appt: 05/19/2025 at 02:45 PM in Cardiology (Benigno Sy MD)       Dx: Erectile dysfunction, unspecified ere...    Test Result Released: No (inaccessible in [facility])    0 Result Notes      Component  Ref Range & Units 15:19   POC Color, Urine  Straw, Yellow, Light-Yellow Yellow   POC Appearance, Urine  Clear Clear   POC Glucose, Urine  NEGATIVE mg/dl NEGATIVE   POC Bilirubin, Urine  NEGATIVE NEGATIVE   POC Ketones, Urine  NEGATIVE mg/dl NEGATIVE   POC Specific Gravity, Urine  1.005 - 1.035 1.015   POC Blood, Urine  NEGATIVE NEGATIVE   POC PH, Urine  No Reference Range Established PH 5.5   POC Protein, Urine  NEGATIVE mg/dl NEGATIVE   POC Urobilinogen, Urine  0.2, 1.0 EU/DL 0.2   Poc Nitrite, Urine  NEGATIVE NEGATIVE   POC Leukocytes, Urine  NEGATIVE NEGATIVE             Specimen Collected: 05/08/25 15:19 Last Resulted: 05/08/25 15:19       ASSESSMENT&PLAN:      IMPRESSIONS:     39 yo M referred by Dr. Wills for erectile dysfunction / low T      #Erectile Dysfunction  -Duration: about 2 years ago   -Rigidity of erections: 5/10   -Able to maintain until ejaculation: not always   -Morning Erections: occasionally   -s/p prostatectomy: no  -on nitrates/NTG?: no  -smoker? no     -partner no       -Tried PDE5is?: Yes  ---Viagra/sildenafil - response:yes/ effective   ---Cialis/Tadalafil- response: no   -Tried penile injections: no  - Libido/Desire: diminished, concerned with testosterone levels T 289  - been on Testosterone /anabolic steroids? no   -Pain or curvature in penis when erect: none  -Premature or delayed ejaculation: none  cardiac ablation 2 years ago        All labs reviewed with patient  repeat T normal       Testosterone total  3/5/2025 471  6/7/2023 396  12/17/20 385

## 2025-05-19 ENCOUNTER — OFFICE VISIT (OUTPATIENT)
Dept: CARDIOLOGY | Facility: HOSPITAL | Age: 45
End: 2025-05-19
Payer: COMMERCIAL

## 2025-05-19 VITALS
SYSTOLIC BLOOD PRESSURE: 105 MMHG | OXYGEN SATURATION: 95 % | DIASTOLIC BLOOD PRESSURE: 64 MMHG | HEART RATE: 69 BPM | BODY MASS INDEX: 25.94 KG/M2 | WEIGHT: 185.3 LBS | HEIGHT: 71 IN

## 2025-05-19 DIAGNOSIS — I48.91 ATRIAL FIBRILLATION, UNSPECIFIED TYPE (MULTI): Primary | ICD-10-CM

## 2025-05-19 DIAGNOSIS — Z86.79 HISTORY OF CARDIOMYOPATHY: ICD-10-CM

## 2025-05-19 PROBLEM — I48.19 ATRIAL FIBRILLATION, PERSISTENT (MULTI): Status: RESOLVED | Noted: 2025-04-25 | Resolved: 2025-05-19

## 2025-05-19 PROCEDURE — 93005 ELECTROCARDIOGRAM TRACING: CPT | Performed by: INTERNAL MEDICINE

## 2025-05-19 PROCEDURE — 99212 OFFICE O/P EST SF 10 MIN: CPT | Performed by: INTERNAL MEDICINE

## 2025-05-19 PROCEDURE — 3008F BODY MASS INDEX DOCD: CPT | Performed by: INTERNAL MEDICINE

## 2025-05-19 PROCEDURE — 99214 OFFICE O/P EST MOD 30 MIN: CPT | Performed by: INTERNAL MEDICINE

## 2025-05-19 NOTE — PROGRESS NOTES
"Chief Complaint:   No chief complaint on file.     History Of Present Illness:    Oswald Montilla is a 45 y.o. male here for followup. He has a history of pre-diabetes and atrial fibrillation with RVR diagnosed 3/2019. He was also noted to have a cardiomyopathy though without kamilah HF or volume overload. His EF was 15%-20. Cardiac MRI was unrevealing for a separate etiology of his heart dysfunction. He did not receive diuretics. The most likely cause of his cardiomyopathy was felt to be a tachy mediated process. He had no ischemic symptoms. He was started on rate control therapies with improvement and later resolution of his symptoms. Cardioversion was attempted but he went back into atrial fibrillation. He was started on amiodarone with a load and 10 days later underwent a successful cardioversion though with later recurrence. Underwent atrial fibrillation ablation later in 2019 with recurrent afib noted on followup followed by another cardioversion 9/2019 with no apparent recurrence thereafter.      Last visit he was noted to be in atrial fibrillation with RVR after having had marked exertional fatigue since 12/2024. He was started on Toprol and Eliquis and later underwent ZO DCCV with restoration of sinus rhythm and resolution of his symptoms. He had a repeat TTE that demonstrated EF improvement to 55%. He later saw EP and is now scheduled for a repeat ablation next month.      Last Recorded Vitals:  Vitals:    05/19/25 1453   BP: 105/64   Pulse: 69   SpO2: 95%   Weight: 84.1 kg (185 lb 4.8 oz)   Height: 1.791 m (5' 10.5\")             Allergies:  Patient has no known allergies.    Outpatient Medications:  Current Outpatient Medications   Medication Instructions    apixaban (ELIQUIS) 5 mg, oral, 2 times daily    ciclopirox 1 % shampoo 1 Dose, Topical, Every other day    metoprolol succinate XL (TOPROL-XL) 25 mg, oral, Daily, Do not crush or chew.    tadalafil 20 mg, oral, Daily PRN    valACYclovir (Valtrex) 1 " gram tablet As needed         Physical Exam:  Gen Well appearing adult male sitting up in NAD. Body mass index is 26.21 kg/m².   CV rrr. No m/r/g appreciated. No JVD or leg edema.  Pulm Lungs clear with normal respiratory effort.  Neuro Alert and conversant. Grossly nonfocal.       I reviewed the patient's ECG - NSR    I reviewed most recent imaging / labs / and office notes       Assessment/Plan   1. Atrial fibrillation  Remains in SR s/p DCCV. For repeat ablation next month. He is to continue Toprol and Eliquis for now but not likely long term. Exercise encouraged.    2. Cardiomyopathy   History of. Historically poorly tolerant of GDMT with his cardiomyopathy persisting despite heart rate improvement historically. Last EF showed EF normalization.         Follow-up in early September        Benigno Sy MD

## 2025-05-20 LAB
ATRIAL RATE: 69 BPM
P AXIS: 46 DEGREES
P OFFSET: 195 MS
P ONSET: 138 MS
PR INTERVAL: 162 MS
Q ONSET: 219 MS
QRS COUNT: 11 BEATS
QRS DURATION: 92 MS
QT INTERVAL: 394 MS
QTC CALCULATION(BAZETT): 422 MS
QTC FREDERICIA: 413 MS
R AXIS: 42 DEGREES
T AXIS: 7 DEGREES
T OFFSET: 416 MS
VENTRICULAR RATE: 69 BPM

## 2025-06-04 ENCOUNTER — LAB (OUTPATIENT)
Dept: LAB | Facility: HOSPITAL | Age: 45
End: 2025-06-04
Payer: COMMERCIAL

## 2025-06-04 LAB
ABO GROUP (TYPE) IN BLOOD: NORMAL
ANTIBODY SCREEN: NORMAL
RH FACTOR (ANTIGEN D): NORMAL

## 2025-06-04 PROCEDURE — 86900 BLOOD TYPING SEROLOGIC ABO: CPT

## 2025-06-04 PROCEDURE — 86901 BLOOD TYPING SEROLOGIC RH(D): CPT

## 2025-06-04 PROCEDURE — 86850 RBC ANTIBODY SCREEN: CPT

## 2025-06-05 LAB
ALBUMIN SERPL-MCNC: 4.2 G/DL (ref 3.6–5.1)
ALP SERPL-CCNC: 73 U/L (ref 36–130)
ALT SERPL-CCNC: 20 U/L (ref 9–46)
ANION GAP SERPL CALCULATED.4IONS-SCNC: 10 MMOL/L (CALC) (ref 7–17)
ANION GAP SERPL CALCULATED.4IONS-SCNC: 11 MMOL/L (CALC) (ref 7–17)
AST SERPL-CCNC: 19 U/L (ref 10–40)
BILIRUB SERPL-MCNC: 0.5 MG/DL (ref 0.2–1.2)
BUN SERPL-MCNC: 43 MG/DL (ref 7–25)
BUN SERPL-MCNC: 44 MG/DL (ref 7–25)
BUN/CREAT SERPL: 72 (CALC) (ref 6–22)
CALCIUM SERPL-MCNC: 9.1 MG/DL (ref 8.6–10.3)
CALCIUM SERPL-MCNC: 9.2 MG/DL (ref 8.6–10.3)
CHLORIDE SERPL-SCNC: 102 MMOL/L (ref 98–110)
CHLORIDE SERPL-SCNC: 102 MMOL/L (ref 98–110)
CO2 SERPL-SCNC: 24 MMOL/L (ref 20–32)
CO2 SERPL-SCNC: 26 MMOL/L (ref 20–32)
CREAT SERPL-MCNC: 0.59 MG/DL (ref 0.6–1.29)
CREAT SERPL-MCNC: 0.6 MG/DL (ref 0.6–1.29)
EGFRCR SERPLBLD CKD-EPI 2021: 120 ML/MIN/1.73M2
EGFRCR SERPLBLD CKD-EPI 2021: 120 ML/MIN/1.73M2
ERYTHROCYTE [DISTWIDTH] IN BLOOD BY AUTOMATED COUNT: 15 % (ref 11–15)
EST. AVERAGE GLUCOSE BLD GHB EST-MCNC: 120 MG/DL
EST. AVERAGE GLUCOSE BLD GHB EST-SCNC: 6.6 MMOL/L
GLUCOSE SERPL-MCNC: 81 MG/DL (ref 65–99)
GLUCOSE SERPL-MCNC: 85 MG/DL (ref 65–99)
HBA1C MFR BLD: 5.8 %
HCT VFR BLD AUTO: 45.5 % (ref 38.5–50)
HGB BLD-MCNC: 14.3 G/DL (ref 13.2–17.1)
MCH RBC QN AUTO: 27.4 PG (ref 27–33)
MCHC RBC AUTO-ENTMCNC: 31.4 G/DL (ref 32–36)
MCV RBC AUTO: 87.3 FL (ref 80–100)
PLATELET # BLD AUTO: 241 THOUSAND/UL (ref 140–400)
PMV BLD REES-ECKER: 9.8 FL (ref 7.5–12.5)
POTASSIUM SERPL-SCNC: 4 MMOL/L (ref 3.5–5.3)
POTASSIUM SERPL-SCNC: 4.1 MMOL/L (ref 3.5–5.3)
PROT SERPL-MCNC: 6.8 G/DL (ref 6.1–8.1)
RBC # BLD AUTO: 5.21 MILLION/UL (ref 4.2–5.8)
SODIUM SERPL-SCNC: 137 MMOL/L (ref 135–146)
SODIUM SERPL-SCNC: 138 MMOL/L (ref 135–146)
WBC # BLD AUTO: 6.4 THOUSAND/UL (ref 3.8–10.8)

## 2025-06-06 ENCOUNTER — TELEPHONE (OUTPATIENT)
Dept: PRIMARY CARE | Facility: CLINIC | Age: 45
End: 2025-06-06
Payer: COMMERCIAL

## 2025-06-06 NOTE — TELEPHONE ENCOUNTER
----- Message from Clarice Saravia sent at 6/5/2025  4:18 PM EDT -----  Please call patient with the following results:    Hemoglobin A1c is 5.8% keep up the great work!   Metabolic panel is normal.   ----- Message -----  From: Cursogram Results In  Sent: 6/5/2025   6:23 AM EDT  To: Clarice Saravia, DO

## 2025-06-17 ENCOUNTER — TELEPHONE (OUTPATIENT)
Dept: CARDIOLOGY | Facility: HOSPITAL | Age: 45
End: 2025-06-17
Payer: COMMERCIAL

## 2025-06-17 PROBLEM — I48.19 ATRIAL FIBRILLATION, PERSISTENT (MULTI): Status: ACTIVE | Noted: 2025-04-25

## 2025-06-19 ENCOUNTER — ANESTHESIA EVENT (OUTPATIENT)
Dept: CARDIOLOGY | Facility: HOSPITAL | Age: 45
End: 2025-06-19
Payer: COMMERCIAL

## 2025-06-20 ENCOUNTER — ANESTHESIA (OUTPATIENT)
Dept: CARDIOLOGY | Facility: HOSPITAL | Age: 45
End: 2025-06-20
Payer: COMMERCIAL

## 2025-06-20 ENCOUNTER — HOSPITAL ENCOUNTER (OUTPATIENT)
Facility: HOSPITAL | Age: 45
Setting detail: OUTPATIENT SURGERY
Discharge: HOME | End: 2025-06-20
Attending: INTERNAL MEDICINE | Admitting: INTERNAL MEDICINE
Payer: COMMERCIAL

## 2025-06-20 DIAGNOSIS — I48.19 ATRIAL FIBRILLATION, PERSISTENT (MULTI): ICD-10-CM

## 2025-06-20 LAB
ABO GROUP (TYPE) IN BLOOD: NORMAL
ACT BLD: 255 SEC (ref 82–174)
ACT BLD: 255 SEC (ref 82–174)
ACT BLD: 258 SEC (ref 82–174)
ACT BLD: 315 SEC (ref 82–174)
ACT BLD: 378 SEC (ref 82–174)
RH FACTOR (ANTIGEN D): NORMAL

## 2025-06-20 PROCEDURE — G0269 OCCLUSIVE DEVICE IN VEIN ART: HCPCS | Performed by: INTERNAL MEDICINE

## 2025-06-20 PROCEDURE — 93656 COMPRE EP EVAL ABLTJ ATR FIB: CPT | Performed by: INTERNAL MEDICINE

## 2025-06-20 PROCEDURE — 3700000002 HC GENERAL ANESTHESIA TIME - EACH INCREMENTAL 1 MINUTE: Performed by: INTERNAL MEDICINE

## 2025-06-20 PROCEDURE — 7100000001 HC RECOVERY ROOM TIME - INITIAL BASE CHARGE: Performed by: INTERNAL MEDICINE

## 2025-06-20 PROCEDURE — 2500000005 HC RX 250 GENERAL PHARMACY W/O HCPCS: Performed by: ANESTHESIOLOGIST ASSISTANT

## 2025-06-20 PROCEDURE — 2780000003 HC OR 278 NO HCPCS: Performed by: INTERNAL MEDICINE

## 2025-06-20 PROCEDURE — 3700000001 HC GENERAL ANESTHESIA TIME - INITIAL BASE CHARGE: Performed by: INTERNAL MEDICINE

## 2025-06-20 PROCEDURE — 2500000004 HC RX 250 GENERAL PHARMACY W/ HCPCS (ALT 636 FOR OP/ED): Performed by: INTERNAL MEDICINE

## 2025-06-20 PROCEDURE — 2500000004 HC RX 250 GENERAL PHARMACY W/ HCPCS (ALT 636 FOR OP/ED): Performed by: ANESTHESIOLOGIST ASSISTANT

## 2025-06-20 PROCEDURE — 7100000002 HC RECOVERY ROOM TIME - EACH INCREMENTAL 1 MINUTE: Performed by: INTERNAL MEDICINE

## 2025-06-20 PROCEDURE — 2720000007 HC OR 272 NO HCPCS: Performed by: INTERNAL MEDICINE

## 2025-06-20 PROCEDURE — 85347 COAGULATION TIME ACTIVATED: CPT

## 2025-06-20 PROCEDURE — C1759 CATH, INTRA ECHOCARDIOGRAPHY: HCPCS | Performed by: INTERNAL MEDICINE

## 2025-06-20 PROCEDURE — C1730 CATH, EP, 19 OR FEW ELECT: HCPCS | Performed by: INTERNAL MEDICINE

## 2025-06-20 PROCEDURE — 76937 US GUIDE VASCULAR ACCESS: CPT | Performed by: INTERNAL MEDICINE

## 2025-06-20 PROCEDURE — C1731 CATH, EP, 20 OR MORE ELEC: HCPCS | Performed by: INTERNAL MEDICINE

## 2025-06-20 PROCEDURE — C1766 INTRO/SHEATH,STRBLE,NON-PEEL: HCPCS | Performed by: INTERNAL MEDICINE

## 2025-06-20 PROCEDURE — C1732 CATH, EP, DIAG/ABL, 3D/VECT: HCPCS | Performed by: INTERNAL MEDICINE

## 2025-06-20 PROCEDURE — 85347 COAGULATION TIME ACTIVATED: CPT | Performed by: INTERNAL MEDICINE

## 2025-06-20 PROCEDURE — C1760 CLOSURE DEV, VASC: HCPCS | Performed by: INTERNAL MEDICINE

## 2025-06-20 RX ORDER — PROPOFOL 10 MG/ML
INJECTION, EMULSION INTRAVENOUS AS NEEDED
Status: DISCONTINUED | OUTPATIENT
Start: 2025-06-20 | End: 2025-06-20

## 2025-06-20 RX ORDER — PROTAMINE SULFATE 10 MG/ML
INJECTION, SOLUTION INTRAVENOUS AS NEEDED
Status: DISCONTINUED | OUTPATIENT
Start: 2025-06-20 | End: 2025-06-20

## 2025-06-20 RX ORDER — NORETHINDRONE AND ETHINYL ESTRADIOL 0.5-0.035
KIT ORAL AS NEEDED
Status: DISCONTINUED | OUTPATIENT
Start: 2025-06-20 | End: 2025-06-20

## 2025-06-20 RX ORDER — HEPARIN SODIUM 10000 [USP'U]/100ML
INJECTION, SOLUTION INTRAVENOUS CONTINUOUS PRN
Status: DISCONTINUED | OUTPATIENT
Start: 2025-06-20 | End: 2025-06-20 | Stop reason: HOSPADM

## 2025-06-20 RX ORDER — LIDOCAINE HYDROCHLORIDE 20 MG/ML
INJECTION, SOLUTION INFILTRATION; PERINEURAL AS NEEDED
Status: DISCONTINUED | OUTPATIENT
Start: 2025-06-20 | End: 2025-06-20

## 2025-06-20 RX ORDER — PHENYLEPHRINE HCL IN 0.9% NACL 1 MG/10 ML
SYRINGE (ML) INTRAVENOUS AS NEEDED
Status: DISCONTINUED | OUTPATIENT
Start: 2025-06-20 | End: 2025-06-20

## 2025-06-20 RX ORDER — ONDANSETRON HYDROCHLORIDE 2 MG/ML
INJECTION, SOLUTION INTRAVENOUS AS NEEDED
Status: DISCONTINUED | OUTPATIENT
Start: 2025-06-20 | End: 2025-06-20

## 2025-06-20 RX ORDER — ROCURONIUM BROMIDE 10 MG/ML
INJECTION, SOLUTION INTRAVENOUS AS NEEDED
Status: DISCONTINUED | OUTPATIENT
Start: 2025-06-20 | End: 2025-06-20

## 2025-06-20 RX ORDER — HEPARIN SODIUM 1000 [USP'U]/ML
INJECTION, SOLUTION INTRAVENOUS; SUBCUTANEOUS AS NEEDED
Status: DISCONTINUED | OUTPATIENT
Start: 2025-06-20 | End: 2025-06-20 | Stop reason: HOSPADM

## 2025-06-20 RX ORDER — FENTANYL CITRATE 50 UG/ML
INJECTION, SOLUTION INTRAMUSCULAR; INTRAVENOUS AS NEEDED
Status: DISCONTINUED | OUTPATIENT
Start: 2025-06-20 | End: 2025-06-20

## 2025-06-20 RX ORDER — PANTOPRAZOLE SODIUM 40 MG/1
40 TABLET, DELAYED RELEASE ORAL 2 TIMES DAILY
Qty: 60 TABLET | Refills: 0 | Status: SHIPPED | OUTPATIENT
Start: 2025-06-20 | End: 2025-07-20

## 2025-06-20 RX ORDER — MIDAZOLAM HYDROCHLORIDE 1 MG/ML
INJECTION INTRAMUSCULAR; INTRAVENOUS AS NEEDED
Status: DISCONTINUED | OUTPATIENT
Start: 2025-06-20 | End: 2025-06-20

## 2025-06-20 RX ORDER — PHENYLEPHRINE 10 MG/250 ML(40 MCG/ML)IN 0.9 % SOD.CHLORIDE INTRAVENOUS
CONTINUOUS PRN
Status: DISCONTINUED | OUTPATIENT
Start: 2025-06-20 | End: 2025-06-20

## 2025-06-20 RX ADMIN — PROPOFOL 120 MG: 10 INJECTION, EMULSION INTRAVENOUS at 08:03

## 2025-06-20 RX ADMIN — MIDAZOLAM HYDROCHLORIDE 2 MG: 2 INJECTION, SOLUTION INTRAMUSCULAR; INTRAVENOUS at 08:03

## 2025-06-20 RX ADMIN — EPHEDRINE SULFATE 5 MG: 50 INJECTION INTRAVENOUS at 11:42

## 2025-06-20 RX ADMIN — ONDANSETRON 4 MG: 2 INJECTION, SOLUTION INTRAMUSCULAR; INTRAVENOUS at 11:04

## 2025-06-20 RX ADMIN — SODIUM CHLORIDE, SODIUM LACTATE, POTASSIUM CHLORIDE, AND CALCIUM CHLORIDE: 600; 310; 30; 20 INJECTION, SOLUTION INTRAVENOUS at 07:51

## 2025-06-20 RX ADMIN — PROPOFOL 30 MG: 10 INJECTION, EMULSION INTRAVENOUS at 10:48

## 2025-06-20 RX ADMIN — ROCURONIUM BROMIDE 30 MG: 10 INJECTION, SOLUTION INTRAVENOUS at 10:48

## 2025-06-20 RX ADMIN — ROCURONIUM BROMIDE 10 MG: 10 INJECTION, SOLUTION INTRAVENOUS at 10:25

## 2025-06-20 RX ADMIN — SUGAMMADEX 200 MG: 100 INJECTION, SOLUTION INTRAVENOUS at 11:15

## 2025-06-20 RX ADMIN — ROCURONIUM BROMIDE 70 MG: 10 INJECTION, SOLUTION INTRAVENOUS at 08:03

## 2025-06-20 RX ADMIN — Medication 160 MCG: at 08:32

## 2025-06-20 RX ADMIN — LIDOCAINE HYDROCHLORIDE 100 MG: 20 INJECTION, SOLUTION INFILTRATION; PERINEURAL at 08:03

## 2025-06-20 RX ADMIN — EPHEDRINE SULFATE 10 MG: 50 INJECTION INTRAVENOUS at 11:46

## 2025-06-20 RX ADMIN — FENTANYL CITRATE 100 MCG: 50 INJECTION, SOLUTION INTRAMUSCULAR; INTRAVENOUS at 08:03

## 2025-06-20 RX ADMIN — ROCURONIUM BROMIDE 20 MG: 10 INJECTION, SOLUTION INTRAVENOUS at 08:58

## 2025-06-20 RX ADMIN — Medication 60 MCG: at 08:03

## 2025-06-20 RX ADMIN — PHENYLEPHRINE-NACL IV SOLUTION 10 MG/250ML-0.9% 0.3 MCG/KG/MIN: 10-0.9/25 SOLUTION at 08:03

## 2025-06-20 RX ADMIN — ROCURONIUM BROMIDE 20 MG: 10 INJECTION, SOLUTION INTRAVENOUS at 09:59

## 2025-06-20 RX ADMIN — PROTAMINE SULFATE 40 MG: 10 INJECTION, SOLUTION INTRAVENOUS at 11:04

## 2025-06-20 ASSESSMENT — PAIN SCALES - GENERAL
PAINLEVEL_OUTOF10: 0 - NO PAIN
PAINLEVEL_OUTOF10: 0 - NO PAIN

## 2025-06-20 ASSESSMENT — COLUMBIA-SUICIDE SEVERITY RATING SCALE - C-SSRS
6. HAVE YOU EVER DONE ANYTHING, STARTED TO DO ANYTHING, OR PREPARED TO DO ANYTHING TO END YOUR LIFE?: NO
1. IN THE PAST MONTH, HAVE YOU WISHED YOU WERE DEAD OR WISHED YOU COULD GO TO SLEEP AND NOT WAKE UP?: NO
2. HAVE YOU ACTUALLY HAD ANY THOUGHTS OF KILLING YOURSELF?: NO

## 2025-06-20 ASSESSMENT — PAIN - FUNCTIONAL ASSESSMENT
PAIN_FUNCTIONAL_ASSESSMENT: 0-10

## 2025-06-20 NOTE — ANESTHESIA PREPROCEDURE EVALUATION
Patient: Oswald Montilla    Procedure Information       Anesthesia Start Date/Time: 06/20/25 0757    Procedure: Ablation A-Fib Persistant - General Anesthesia, EPS, 3D with CARTO CPT 15305    Location: [facility] STEREO / [facility] MAT 3529 Cardiac Cath Lab    Providers: Osito Quintanilla MD            Relevant Problems   Cardiac   (+) Atrial fibrillation, persistent (Multi)   (+) Atrial fibrillation, unspecified type (Multi)      Pulmonary   (+) HERNANDEZ (dyspnea on exertion)      Neuro   (+) Depression due to head injury   (+) MANDY (generalized anxiety disorder)      ID   (+) Herpes gladiatorum   (+) Herpes simplex       Clinical information reviewed:    Allergies                NPO Detail:  NPO/Void Status  Date of Last Liquid: 06/19/25  Time of Last Liquid: 2000  Date of Last Solid: 06/19/25  Time of Last Solid: 2000         Physical Exam    Airway  Mallampati: I  TM distance: >3 FB  Neck ROM: full  Mouth opening: 3 or more finger widths     Cardiovascular   Rhythm: regular  Rate: normal     Dental    Pulmonary Breath sounds clear to auscultation     Abdominal            Anesthesia Plan    History of general anesthesia?: yes  History of complications of general anesthesia?: no    ASA 2     general   (Plan GA by GERMAINE, art line.)  intravenous induction   Anesthetic plan and risks discussed with patient.    Plan discussed with CAA.

## 2025-06-20 NOTE — DISCHARGE INSTRUCTIONS
INSTRUCTIONS AFTER ABLATION PROCEDURE:    * You need to continue blood thinner (Eliquis every 12 hours) until instructed otherwise. It is important not to interrupt blood thinner for any reason (other than an emergency) during the first 30 days after ablation.    * You need to start Pantoprazole 40 mg twice daily (a heartburn medicine) for 4 weeks to protect the esophagus as it can become irritated with ablation using thermal energy. It is very important that you take this medication if prescribed. Please  this medication at your local pharmacy ([facility] in [facility]) on your way home today.    * All other medications will generally remain the same unless you are told otherwise.  Resume taking your home medications today (including blood thinner) as listed on the discharge instructions.    * In the first week post-ablation you should take it easy. No heavy lifting or heavy exercise, no treadmill. You can use the stairs if needed but go slowly and minimize the number of times up and down.    * Some minor bruising is common at each groin access site with minor soreness as if you had banged the area. Bruising may occasionally be seen to extend down the leg. This is normal as is an occasional small quarter sized bump in the area. If larger swelling or more significant pain occurs at the area, please contact the office or go the nearest Emergency Room.    * You may have some minor chest pain for the next week or so. The pain will often worsen with a deep breath and be better when leaning forward. This is pericardial chest pain from the ablation and is generally not of concern. It should resolve within a week although it might increase for a day or so after the ablation.    * If you develop unexplained fevers exceeding 100 degrees anytime within the first 3 weeks post-ablation, you need to contact the office. Low grade fevers of around 99 degrees are common in the first day or so post-ablation.    *  Atrial fibrillation (AFib) can recur in all patients who undergo this ablation for up to 4-8 weeks post-ablation. The ablation itself can cause inflammation (pericarditis) in the atria and this can cause AFib. Some patients will actually experience an increased amount of atrial arrhythmia early after ablation. Approximately 1/3 of patients will have this early recurrence of AFib. Medications should be continued and your heart rate controlled. Nothing else needs be done initially except waiting as in many cases these episodes of AFib will prove self limited.    * Continue to follow up with your primary care physician, primary cardiologist, and any other specialists you normally see.    * No driving for 2 days post procedure (IF you were driving prior to procedure)    *Diet: Heart healthy    Call Provider If:  Breathing faster than normal.     Fever of 100.4 F (38 C) or higher.     Chills.     Any new concerning symptoms.     Passing out.     Patient Instructions, Next 24 hours:  DO NOT drive a car, operate machinery or power tools.  It is recommended that a responsible adult be with you for the first 24 hours.     DO NOT drink any alcoholic drinks or take any non-prescriptive medications that contain alcohol for the first 24 hours.     DO NOT make any important decisions for the first 24 hours.    Activity:  You are advised to go directly home from the hospital.     DO NOT lift anything heavier than 10 pounds for one week, this allows for proper healing of the groin.     No excessive exercise or treadmill use for one week. You may walk and do stairs, slowly.     No sexual activities for 24 hours after you arrive home.    Wound Care:  If slight bleeding should occur at groin site, lie down and have someone apply firm pressure just above the puncture site for 5 minutes.  If it continues or is profuse, call 911. Always notify your doctor if bleeding occurs.     Keep site clean and dry. Let air dry or you may use a  simple bandaid.     Gently cleanse the puncture site in your groin with soap and water only.     You may experience some tenderness, bruising or minimal inflammation.  If you have any concerns, you may contact the EP Lab or if any of these symptoms become excessive, contact your electrophysiologist or go to the emergency room.     No tub baths, soaking, hot tubs, or swimming for one week.     May shower the next day after your procedure.    Other Instructions:  If you have any questions about the effects of the sedative drugs or groin care, please call the physician who performed your procedure.    FOLLOW UP:  1) Primary care physician 2 weeks--call to schedule    2) Valeria Hooker CNP ( Electrophysiology) 1 month after ablation as scheduled

## 2025-06-20 NOTE — ANESTHESIA PROCEDURE NOTES
Peripheral IV  Date/Time: 6/20/2025 8:12 AM  Inserted by: Huy Llamas MD    Placement  Needle size: 16 G  Laterality: right  Location: hand  Local anesthetic: none  Site prep: alcohol  Technique: anatomical landmarks  Attempts: 1

## 2025-06-20 NOTE — H&P
History Of Present Illness  Oswald Montilla is a 45 y.o. male with PMH of Depression, General Anxiety Disorder, Herpes Simplex, Prediabetes.    CARDIAC HISTORY:  AF - Index Dx 2019. S/p  ZO/CVN in 03/2019. He reverted back to AF and underwent PVI 09/2019, with noted recurrent AF on follow up followed by another DCCV. Over the past couple of months he has become more fatigued and SOB. Underwent another ZO/DCCV on 04/08/25 with success. In 2019  during PVI LA voltage map performed during AF. No areas of low voltage/scar noted outside PV's. Anatomy significant for 3 pulmonary veins on each side. B/L WACA lesions with anastasia line between LIPV and LMPV and double anastasia lines on right side. Catheter taken to right side, additional lesions placed on septal aspect of SVC/RA junction where marked fractionation and short CL of AF noted. DCCV performed next x1. Catheter taken back to left side, PV isolation confirmed with PentaRay catheter. He tolerated the procedure well with no immediate complications. . However he is was in AF and on 09/13/2019 underwent ZO guided cardioversion.  Cardiomyopathy - Was thought to be related to his AF with RVR. 2019 EF 20%     [id number] Score  Age <65: 0   Sex Male: 0   CHF History No: 0   HTN No: 0   Stroke/TIA/Thromboembolism No: 0   Vascular Dz: CAD/PAD/Aortic Plaque No: 0   DM No: 0   Total Score 0      RELEVANT TESTING:     Transthoracic echo (TTE) complete 04/15/2025   CONCLUSIONS:   1. Left ventricular ejection fraction is normal, calculated by Richardson's biplane at 55%.   2. The left atrium is mildly dilated.   3. Compared with study dated 4/8/2025, there is significant increase in calculated LVEF from 40% on prior transesophageal echocardiogram prior to cardioversion and now up to 55%.     Event Monitor Preventice 14 Days 3/31/25-4/14/25  *The predominant rhythm was Atrial Fibrillation/Flutter  *The Maximum Heart Rate recorded was 201 bpm, 04/07 18:52:56, the Minimum Heart Rate  recorded was 43 bpm, 04/04 07:59:29, and the Average Heart Rate was 94 bpm.  *There were 400 VE beats with a burden of <1 %.  *The study included an Atrial Fibrillation/Flutter [place] of 100 % with 33 % in RVR and <1 % in SVR. The longest episode was 8d 0h 48m 21.2s, 03/31 11:38:27, and the Fastest episode was 201 bpm, 03/31 11:38:27.  *There were 0 Patient Triggers.     Past Medical History  Medical History[1]    Surgical History  Surgical History[2]     Social History  He reports that he has never smoked. He has never been exposed to tobacco smoke. He has never used smokeless tobacco. He reports that he does not currently use alcohol. He reports current drug use. Drug: Marijuana.    Family History  Family History[3]     Allergies  Patient has no known allergies.    Review of Systems   All other systems reviewed and are negative.       Physical Exam  Vitals and nursing note reviewed.   Cardiovascular:      Rate and Rhythm: Normal rate and regular rhythm.      Pulses: Normal pulses.      Heart sounds: Normal heart sounds.   Pulmonary:      Effort: Pulmonary effort is normal.      Breath sounds: Normal breath sounds.   Musculoskeletal:         General: Normal range of motion.   Skin:     General: Skin is warm.      Capillary Refill: Capillary refill takes less than 2 seconds.   Neurological:      General: No focal deficit present.      Mental Status: He is oriented to person, place, and time. Mental status is at baseline.          Last Recorded Vitals  There were no vitals taken for this visit.    Relevant Results         Assessment & Plan  Atrial fibrillation, persistent (Multi)      RFA for AF ablation with general anesthesia  NO BBB or typical/atypical AFL documented on 12-lead ECGs recently    I spent 60 minutes in the professional and overall care of this patient.      Davi Pena MD         [1]   Past Medical History:  Diagnosis Date    Other conditions influencing health status     Traumatic brain  injury with prolonged (more than 24 hours) loss of consciousness    Personal history of other diseases of the nervous system and sense organs     History of diplopia   [2]   Past Surgical History:  Procedure Laterality Date    APPENDECTOMY  10/09/2017    Appendectomy    OTHER SURGICAL HISTORY  10/09/2017    Eye Surgery Results [facility]   [3]   Family History  Problem Relation Name Age of Onset    Heart attack Mother      Colon cancer Father  72    Diabetes Father      Other (CIRCULATION PROBLEM) Father      Coronary artery disease Father      Heart attack Father's Brother

## 2025-06-20 NOTE — ANESTHESIA PROCEDURE NOTES
Arterial Line:    Date/Time: 6/20/2025 8:14 AM    Staffing  Performed: CAA   Authorized by: Huy Llamas MD    Performed by: AMMY Smith    An arterial line was placed. Procedure performed using surface landmarks.in the OR for the following indication(s): continuous blood pressure monitoring.    A 20 gauge (size) (length), Angiocath (type) catheter was placed into the Left radial artery, secured by Tegaderm,   Seldinger technique not used.  Events:  patient tolerated procedure well with no complications.

## 2025-06-20 NOTE — ANESTHESIA PROCEDURE NOTES
Airway  Date/Time: 6/20/2025 8:09 AM  Reason: elective    Airway not difficult    Staffing  Performed: TOMMY   Authorized by: Huy Llamas MD    Performed by: AMMY Smith  Patient location during procedure: OR    Patient Condition  Indications for airway management: anesthesia  Patient position: sniffing  Sedation level: deep     Final Airway Details   Preoxygenated: yes  Final airway type: endotracheal airway  Successful airway: ETT  Cuffed: yes   Successful intubation technique: direct laryngoscopy  Adjuncts used in placement: intubating stylet  Endotracheal tube insertion site: oral  Blade: Rosie  Blade size: #4  ETT size (mm): 7.5  Cormack-Lehane Classification: grade I - full view of glottis  Placement verified by: chest auscultation and capnometry   Measured from: lips  ETT to lips (cm): 23  Number of attempts at approach: 1

## 2025-06-20 NOTE — ANESTHESIA POSTPROCEDURE EVALUATION
Patient: Oswald Montilla    Procedure Summary       Date: 06/20/25 Room / Location: [facility] / [facility] MAT 3529 Cardiac Cath Lab    Anesthesia Start: 0757 Anesthesia Stop: 1149    Procedure: Ablation A-Fib Persistant Diagnosis:       Atrial fibrillation, persistent (Multi)      (AF)    Providers: Osito Quintanilla MD Responsible Provider: Huy Llamas MD    Anesthesia Type: general ASA Status: 2            Anesthesia Type: No value filed.    Vitals Value Taken Time   /55 06/20/25 11:52   Temp 36.4 06/20/25 11:52   Pulse 55 06/20/25 11:52   Resp 14 06/20/25 11:52   SpO2 100 06/20/25 11:52       Anesthesia Post Evaluation    Patient location during evaluation: PACU  Patient participation: complete - patient participated  Level of consciousness: awake and alert  Pain management: satisfactory to patient  Multimodal analgesia pain management approach  Airway patency: patent  Cardiovascular status: acceptable, stable and hemodynamically stable  Respiratory status: acceptable and room air  Hydration status: acceptable  Postoperative Nausea and Vomiting: none        There were no known notable events for this encounter.

## 2025-07-25 ENCOUNTER — OFFICE VISIT (OUTPATIENT)
Dept: CARDIOLOGY | Facility: CLINIC | Age: 45
End: 2025-07-25
Payer: COMMERCIAL

## 2025-07-25 VITALS
HEIGHT: 71 IN | WEIGHT: 173.25 LBS | SYSTOLIC BLOOD PRESSURE: 93 MMHG | HEART RATE: 64 BPM | OXYGEN SATURATION: 95 % | BODY MASS INDEX: 24.25 KG/M2 | DIASTOLIC BLOOD PRESSURE: 55 MMHG

## 2025-07-25 DIAGNOSIS — I48.19 ATRIAL FIBRILLATION, PERSISTENT (MULTI): Primary | ICD-10-CM

## 2025-07-25 LAB
ATRIAL RATE: 57 BPM
P AXIS: 49 DEGREES
P OFFSET: 196 MS
P ONSET: 144 MS
PR INTERVAL: 150 MS
Q ONSET: 219 MS
QRS COUNT: 9 BEATS
QRS DURATION: 94 MS
QT INTERVAL: 418 MS
QTC CALCULATION(BAZETT): 406 MS
QTC FREDERICIA: 411 MS
R AXIS: 70 DEGREES
T AXIS: -22 DEGREES
T OFFSET: 428 MS
VENTRICULAR RATE: 57 BPM

## 2025-07-25 PROCEDURE — 1036F TOBACCO NON-USER: CPT | Performed by: NURSE PRACTITIONER

## 2025-07-25 PROCEDURE — 99214 OFFICE O/P EST MOD 30 MIN: CPT | Performed by: NURSE PRACTITIONER

## 2025-07-25 PROCEDURE — 3008F BODY MASS INDEX DOCD: CPT | Performed by: NURSE PRACTITIONER

## 2025-07-25 PROCEDURE — 99212 OFFICE O/P EST SF 10 MIN: CPT

## 2025-07-25 PROCEDURE — 93005 ELECTROCARDIOGRAM TRACING: CPT | Performed by: NURSE PRACTITIONER

## 2025-07-25 ASSESSMENT — ENCOUNTER SYMPTOMS
FEVER: 0
NEAR-SYNCOPE: 0
SORE THROAT: 0
HEMOPTYSIS: 0
BLURRED VISION: 0
FALLS: 0
SHORTNESS OF BREATH: 0
PALPITATIONS: 0
DIARRHEA: 0
DIAPHORESIS: 0
ABDOMINAL PAIN: 0
HEADACHES: 0
LIGHT-HEADEDNESS: 0
SYNCOPE: 0
DYSPNEA ON EXERTION: 0
DOUBLE VISION: 0
SPUTUM PRODUCTION: 0
VOMITING: 0
NAUSEA: 0
ORTHOPNEA: 0
COUGH: 0
SNORING: 0
PND: 0
IRREGULAR HEARTBEAT: 0
WEAKNESS: 0
DIZZINESS: 0
MYALGIAS: 0
LOSS OF SENSATION IN FEET: 0
DEPRESSION: 0
OCCASIONAL FEELINGS OF UNSTEADINESS: 0

## 2025-07-25 ASSESSMENT — COLUMBIA-SUICIDE SEVERITY RATING SCALE - C-SSRS
1. IN THE PAST MONTH, HAVE YOU WISHED YOU WERE DEAD OR WISHED YOU COULD GO TO SLEEP AND NOT WAKE UP?: NO
2. HAVE YOU ACTUALLY HAD ANY THOUGHTS OF KILLING YOURSELF?: NO
6. HAVE YOU EVER DONE ANYTHING, STARTED TO DO ANYTHING, OR PREPARED TO DO ANYTHING TO END YOUR LIFE?: NO

## 2025-07-25 ASSESSMENT — PAIN SCALES - GENERAL: PAINLEVEL_OUTOF10: 0-NO PAIN

## 2025-07-25 NOTE — PROGRESS NOTES
Subjective   Oswald Montilla is a 45 y.o. male.    Chief Complaint:  Follow-up and Atrial Fibrillation    Oswald Montilla has a past medical history of Depression, General Anxiety Disorder, Herpes Simplex, Prediabetes.  CARDIAC HISTORY:  1. AF - Index Dx 2019. S/p  ZO/CVN in 03/2019. He reverted back to AF and underwent PVI 09/2019, with noted recurrent AF on follow up followed by another DCCV. Over the past couple of months he has become more fatigued and SOB. Underwent another ZO/DCCV on 04/08/25 with success. In 2019  during PVI LA voltage map performed during AF. No areas of low voltage/scar noted outside PV's. Anatomy significant for 3 pulmonary veins on each side. B/L [place] lesions with anastasia line between LIPV and LMPV and double anastasia lines on right side. Catheter taken to right side, additional lesions placed on septal aspect of SVC/RA junction where marked fractionation and short CL of AF noted. DCCV performed next x1. Catheter taken back to left side, PV isolation confirmed with PentaRay catheter. He tolerated the procedure well with no immediate complications. . However he is was in AF and on 09/13/2019 underwent ZO guided cardioversion.  2. Cardiomyopathy - Was thought to be related to his AF with RVR. 2019 EF 20%     [id number] Score 0     RELEVANT TESTING:  Transthoracic echo (TTE) complete 04/15/2025 1. Left ventricular ejection fraction is normal, calculated by Richardson's biplane at 55%. 2. The left atrium is mildly dilated. 3. Compared with study dated 4/8/2025, there is significant increase in calculated LVEF from 40% on prior transesophageal echocardiogram prior to cardioversion and now up to 55%.     Event Monitor [employer] 14 Days 3/31/25-4/14/25  *The predominant rhythm was Atrial Fibrillation/Flutter  *The Maximum Heart Rate recorded was 201 bpm, 04/07 18:52:56, the Minimum Heart Rate recorded was 43 bpm, 04/04 07:59:29, and the Average Heart Rate was 94 bpm.  *There were 400 VE beats  "with a burden of <1 %.  *The study included an Atrial Fibrillation/Flutter [place] of 100 % with 33 % in RVR and <1 % in SVR. The longest episode was 8d 0h 48m 21.2s, 03/31 11:38:27, and the Fastest episode was 201 bpm, 03/31 11:38:27.    Now s/p repeat PVI RFA with Dr. Quintanilla 6/20/2025  ECG 7/25/2025 SB HR 57 bpm    TODAY Patient presents for 1 month follow up post redo Afib ablation. He has been feeling well since the procedure, denies any further Afib symptoms. He had some intermittent short episodes of chest pain at rest, but it has gotten better with each week.    BP 93/55 (BP Location: Left arm, Patient Position: Sitting, BP Cuff Size: Large adult)   Pulse 64   Ht 1.803 m (5' 11\")   Wt 78.6 kg (173 lb 4 oz)   SpO2 95%   BMI 24.16 kg/m²   Medications Ordered Prior to Encounter[1]      Review of Systems   Constitutional: Negative for diaphoresis, fever and malaise/fatigue.   HENT:  Negative for congestion and sore throat.    Eyes:  Negative for blurred vision and double vision.   Cardiovascular:  Positive for chest pain. Negative for dyspnea on exertion, irregular heartbeat, leg swelling, near-syncope, orthopnea, palpitations, paroxysmal nocturnal dyspnea and syncope.   Respiratory:  Negative for cough, hemoptysis, shortness of breath, snoring and sputum production.    Hematologic/Lymphatic: Negative for bleeding problem.   Skin:  Negative for rash.   Musculoskeletal:  Negative for falls, joint pain and myalgias.   Gastrointestinal:  Negative for abdominal pain, diarrhea, nausea and vomiting.   Neurological:  Negative for dizziness, headaches, light-headedness and weakness.   All other systems reviewed and are negative.      Objective   Constitutional:       Appearance: Healthy appearance. Not in distress.   Eyes:      Conjunctiva/sclera: Conjunctivae normal.   HENT:      Nose: Nose normal.    Mouth/Throat:      Pharynx: Oropharynx is clear.   Neck:      Vascular: No JVR. JVD normal.   Pulmonary:      " Effort: Pulmonary effort is normal.      Breath sounds: Normal breath sounds. No wheezing. No rhonchi.   Chest:      Chest wall: Not tender to palpatation.   Cardiovascular:      Normal rate. Regular rhythm.      Murmurs: There is no murmur.      No rub.   Pulses:     Intact distal pulses.   Edema:     Peripheral edema absent.   Abdominal:      General: Bowel sounds are normal.      Palpations: Abdomen is soft.   Musculoskeletal: Normal range of motion.      Cervical back: Neck supple. Skin:     General: Skin is warm and dry.      Comments: Right groin site well approximated, no bruising/bleeding/swelling/redness/pain   Neurological:      Mental Status: Alert and oriented to person, place and time.      Motor: Motor function is intact.       Lab Review:   Admission on 06/20/2025, Discharged on 06/20/2025   Component Date Value    ABO TYPE 06/20/2025 O     Rh TYPE 06/20/2025 POS     POCT Activated Clotting * 06/20/2025 255 (H)     POCT Activated Clotting * 06/20/2025 255 (H)     POCT Activated Clotting * 06/20/2025 258 (H)     POCT Activated Clotting * 06/20/2025 315 (H)     POCT Activated Clotting * 06/20/2025 378 (H)      I personally reviewed the patient's latest PCP, general cardiology and ED notes.  I personally reviewed the patient's latest ECG, echocardiogram and stress test.  Results are within normal limits      Assessment/Plan   The encounter diagnosis was Atrial fibrillation, persistent (Multi).  Patient is now 1 month post redo PVI.  He denies any further arrhythmia symptoms and has been doing his summer [employer] work without any issues or symptoms.    We discussed normal symptoms that can happen post ablation and when he should go to the ED or have an office visit.    We discussed lifestyle modifications that can help maintain normal sinus rhythm such as exercise, weight loss, managing hypertension, treating sleep apnea, and minimizing alcohol intake.  All questions asked and answered.    Continue  Eliquis uninterrupted for 3 months post ablation, CHADSVASC is 0, so he can then stop it  Continue metoprolol for now, we can likely wean off of that 3-6 months post procedure  General cardiology is scheduled in 2 months  He can follow up with me in 6 months or sooner as needed       [1]   Current Outpatient Medications on File Prior to Visit   Medication Sig Dispense Refill    apixaban (Eliquis) 5 mg tablet Take 1 tablet (5 mg) by mouth 2 times a day. 60 tablet 11    ciclopirox 1 % shampoo Apply 1 Dose topically every other day. 120 mL 3    metoprolol succinate XL (Toprol-XL) 25 mg 24 hr tablet Take 1 tablet (25 mg) by mouth once daily. Do not crush or chew. 30 tablet 11    tadalafil 20 mg tablet Take 1 tablet (20 mg) by mouth once daily as needed for erectile dysfunction. 12 tablet 3    valACYclovir (Valtrex) 1 gram tablet Take by mouth if needed.      [DISCONTINUED] pantoprazole (ProtoNix) 40 mg EC tablet Take 1 tablet (40 mg) by mouth 2 times a day. Do not crush, chew, or split. 60 tablet 0     No current facility-administered medications on file prior to visit.

## 2025-10-09 ENCOUNTER — APPOINTMENT (OUTPATIENT)
Dept: PRIMARY CARE | Facility: CLINIC | Age: 45
End: 2025-10-09
Payer: COMMERCIAL

## (undated) DEVICE — TUBING SET, IRRIGATION, SMARTABLATE

## (undated) DEVICE — ELECTRODE, QUICK-COMBO, REDI PACK

## (undated) DEVICE — PATCHES, EXTERNAL REFERENCE, CARTO3

## (undated) DEVICE — CABLE, CARTO 3 SYSTEM, ECO INTERFACE, 34-PIN, SPLIT HANDLE (REPROCESSED)

## (undated) DEVICE — CATHETER, DIAGNOSTIC, SOUNDSTAR ECO SMS, 8FR

## (undated) DEVICE — CABLE, OCTARAY, SPLIT HANDLE EXT CABLE, 10FT LG

## (undated) DEVICE — INTERFACE CABLE, EXISITING DX CATHETERS, BLUE PORT (REPROCESS)

## (undated) DEVICE — NEEDLE, NRG TRANSSEPTAL, 98CM, CURVE C0

## (undated) DEVICE — CATHETER, OCTARAY, OCTA,GALAXY,3-3-3-3-3,D-CURVE

## (undated) DEVICE — CATHETHER, CS, BI-DIRECTIONAL, 10 POLES, D-F TYPE

## (undated) DEVICE — INTRODUCER, HEMOSTASIS, STR/J .038 IN, 8.5FR 12CM

## (undated) DEVICE — CABLE, CONNECTOR, 10FT (REPROCESSED)

## (undated) DEVICE — CATHETER, THERMOCOOL SMART TOUCH, SF, D-F CURVE

## (undated) DEVICE — INTRODUCER, SHEATH, FAST-CATH, 8FR X 12CM, C-LOCK

## (undated) DEVICE — CABLE, CONNECTOR, 10FT

## (undated) DEVICE — DEVICE, CLOSURE, PERCLOSE, PROSTYLE

## (undated) DEVICE — CABLE, 34 HYP, 34 LEMO, 10FT, SMART TOUCH (REPROCESS)

## (undated) DEVICE — SHEATH, STEERABLE, SUREFLEX, MEDIUM CURVE